# Patient Record
Sex: FEMALE | Race: WHITE | NOT HISPANIC OR LATINO | Employment: FULL TIME | URBAN - METROPOLITAN AREA
[De-identification: names, ages, dates, MRNs, and addresses within clinical notes are randomized per-mention and may not be internally consistent; named-entity substitution may affect disease eponyms.]

---

## 2023-04-04 DIAGNOSIS — E55.9 VITAMIN D DEFICIENCY: Primary | ICD-10-CM

## 2023-04-04 RX ORDER — ASPIRIN 325 MG
50000 TABLET, DELAYED RELEASE (ENTERIC COATED) ORAL
COMMUNITY
Start: 2017-02-08 | End: 2023-04-04 | Stop reason: SDUPTHER

## 2023-04-04 RX ORDER — ASPIRIN 325 MG
50000 TABLET, DELAYED RELEASE (ENTERIC COATED) ORAL
Qty: 90 CAPSULE | Refills: 1 | Status: SHIPPED | OUTPATIENT
Start: 2023-04-04

## 2023-06-29 ENCOUNTER — TELEPHONE (OUTPATIENT)
Dept: PRIMARY CARE | Facility: CLINIC | Age: 68
End: 2023-06-29

## 2023-06-29 NOTE — TELEPHONE ENCOUNTER
Patient requesting a phone call regarding a medication questions  and also requesting to have labs put in please call to advise.

## 2023-07-11 DIAGNOSIS — Z86.69 HX OF MIGRAINE HEADACHES: Primary | ICD-10-CM

## 2023-07-11 RX ORDER — RIMEGEPANT SULFATE 75 MG/75MG
75 TABLET, ORALLY DISINTEGRATING ORAL EVERY OTHER DAY
Qty: 16 TABLET | Refills: 2 | Status: SHIPPED | OUTPATIENT
Start: 2023-07-11 | End: 2023-08-17 | Stop reason: SDUPTHER

## 2023-07-11 RX ORDER — RIMEGEPANT SULFATE 75 MG/75MG
75 TABLET, ORALLY DISINTEGRATING ORAL EVERY OTHER DAY
COMMUNITY
End: 2023-07-11 | Stop reason: SDUPTHER

## 2023-08-17 DIAGNOSIS — Z86.69 HX OF MIGRAINE HEADACHES: ICD-10-CM

## 2023-08-17 RX ORDER — RIMEGEPANT SULFATE 75 MG/75MG
75 TABLET, ORALLY DISINTEGRATING ORAL EVERY OTHER DAY
Qty: 16 TABLET | Refills: 2 | Status: SHIPPED | OUTPATIENT
Start: 2023-08-17 | End: 2023-10-18 | Stop reason: DRUGHIGH

## 2023-09-23 DIAGNOSIS — U07.1 COVID-19: Primary | ICD-10-CM

## 2023-10-17 PROBLEM — Z90.89 HISTORY OF PARATHYROIDECTOMY: Status: ACTIVE | Noted: 2017-05-01

## 2023-10-17 PROBLEM — Z86.39 HISTORY OF HYPERPARATHYROIDISM: Status: ACTIVE | Noted: 2019-07-17

## 2023-10-17 PROBLEM — F41.9 ANXIETY: Status: ACTIVE | Noted: 2023-10-17

## 2023-10-17 PROBLEM — K21.9 GERD (GASTROESOPHAGEAL REFLUX DISEASE): Status: ACTIVE | Noted: 2023-10-17

## 2023-10-17 PROBLEM — J30.9 ALLERGIC RHINITIS: Status: ACTIVE | Noted: 2023-10-17

## 2023-10-17 PROBLEM — N95.2 ATROPHIC VAGINITIS: Status: ACTIVE | Noted: 2023-10-17

## 2023-10-17 PROBLEM — Z98.890 HISTORY OF PARATHYROIDECTOMY: Status: ACTIVE | Noted: 2017-05-01

## 2023-10-17 PROBLEM — U07.1 COVID-19: Status: ACTIVE | Noted: 2023-10-17

## 2023-10-17 PROBLEM — M18.11 ARTHRITIS OF CARPOMETACARPAL (CMC) JOINT OF RIGHT THUMB: Status: ACTIVE | Noted: 2023-10-17

## 2023-10-17 PROBLEM — R00.2 PALPITATIONS: Status: ACTIVE | Noted: 2023-10-17

## 2023-10-17 RX ORDER — MELOXICAM 15 MG/1
1 TABLET ORAL 2 TIMES DAILY
COMMUNITY
Start: 2013-10-23

## 2023-10-17 RX ORDER — ESTRADIOL 4 UG/1
INSERT VAGINAL
COMMUNITY
End: 2024-04-29 | Stop reason: WASHOUT

## 2023-10-17 RX ORDER — IBUPROFEN 200 MG
CAPSULE ORAL
COMMUNITY
Start: 2023-04-28 | End: 2024-04-29 | Stop reason: ALTCHOICE

## 2023-10-17 RX ORDER — ALPRAZOLAM 0.5 MG/1
1 TABLET ORAL 3 TIMES DAILY
COMMUNITY
Start: 2014-10-09 | End: 2023-10-18 | Stop reason: ALTCHOICE

## 2023-10-17 RX ORDER — LEVOTHYROXINE SODIUM 100 UG/1
1 TABLET ORAL DAILY
COMMUNITY
Start: 2006-10-30 | End: 2023-10-31 | Stop reason: SDUPTHER

## 2023-10-17 RX ORDER — BUTALBITAL, ACETAMINOPHEN AND CAFFEINE 300; 40; 50 MG/1; MG/1; MG/1
1 CAPSULE ORAL EVERY 6 HOURS PRN
COMMUNITY
Start: 2015-06-06 | End: 2024-04-29 | Stop reason: WASHOUT

## 2023-10-17 RX ORDER — ATENOLOL 50 MG/1
1 TABLET ORAL DAILY
COMMUNITY
Start: 2006-10-30 | End: 2023-10-31 | Stop reason: SDUPTHER

## 2023-10-17 RX ORDER — LANOLIN ALCOHOL/MO/W.PET/CERES
1 CREAM (GRAM) TOPICAL DAILY
COMMUNITY
Start: 2017-04-14

## 2023-10-17 NOTE — PROGRESS NOTES
Subjective   Reason for Visit: Cierra Atkins is an 67 y.o. female here for a Medicare Wellness visit.               HPI  The patient tested positive for COVID-19 on September 23, 2023.  She does report a history of an occasional nonproductive cough, nasal congestion, intermittent bilateral facial pain since testing positive.  She does report a decrease in the frequency of cough, decrease in the frequency and intensity of the episodes of bilateral facial pain and some improvement in nasal congestion over the past 3 weeks..  She reports no fever, shortness of breath, wheezing, chest pain, rhinorrhea, postnasal drip, sneezing.    She also reports a history of alternating consistencies of stool as well as constant abdominal distention since completing a course of Paxlovid at the end of last month.  She reports that the abdominal distention does transiently improve with defecation.  She does report that she did experience passage of multiple liquid stools per day while taking the Paxlovid..  She also reports that she was experiencing frequent episodes of generalized abdominal cramping pain for a period of a few weeks up until past few days.  She also reports a history of a constant sour taste in her mouth since completing the course of Paxlovid.  She reports no exacerbating or relieving factors..  She reports no nausea, vomiting, melena, hematochezia.  She has been using omeprazole 20 mg daily for approximately 1 week and IBgard 90 mg p.o. twice daily for approximately 1 week.    The patient does report a history of generalized fatigue since testing positive for COVID-19.  She does report that her energy level has improved over the past week but is not yet back at baseline.  She does report a history of anorexia which developed at the time that she tested positive for COVID-19 but she reports that her appetite has been back to normal over the past few weeks.    Over the past year, the patient reports continued chronic  intermittent episodes of pressure or sharp pain located in the right cheek, right retro-orbital region greater than left cheek and left retro-orbital region.  She again reports that the episodes can be precipitated by humidity changes, stress, lack of sleep.  She reports that the duration of each episode varies from 20 to 30 minutes to 3 to 5 days with her using coffee, Tylenol, and Nurtec as abortive agents.  She reports no associated visual changes, slurred speech, focal weakness/paresthesias, nausea, vomiting, phonophobia, photophobia.  She reports that she has been averaging 4-7 episodes per month over the past several months.    Over the past year, the patient reports continued chronic intermittent episodes of throbbing pain at the CMC joints of the thumbs.  She again reports that the episodes are precipitated by increased use of the hands.  She reports no associated symptoms.  She does report an increase in the frequency and intensity of the episodes recently as she has been using her hands more.  She has been using her hands more secondary to increased anxiety that she has had over conditions in the [place]..  She reports that intermittently she has had difficulty falling asleep because of the anxiety.  No other new complaints.  Patient Care Team:  Amol Palacio MD as PCP - General  Amol Palacio MD as PCP - [facility] PCP     Review of Systems  All systems have been reviewed and are normal except as previously noted  Objective   Vitals:  There were no vitals taken for this visit.      Physical Exam  Head - palpation revealed no tenderness over the maxillary or frontal sinuses.  Eyes - extraocular movements intact pupils equal and reactive to light; fundi revealed good retinal color no hemorrhages or exudates.  Ears - palpation of pinnas and traguses revealed no tenderness. External auditory canals not erythematous or swollen. The canals are narrow however. Right TM not visualized secondary to impacted  cerumen. Left TM not visualized secondary to impacted cerumen  Nose - turbinates not erythematous or swollen; nasal septal deviation noted to the right.  Mouth - posterior pharynx is not erythematous or swollen. Tonsillar pillars appeared normal no exudates.  Neck - no lymphadenopathy. Thyroid gland not enlarged. No bruits.  Lungs - clear to auscultation bilaterally.  Cardiac - , rate normal, rhythm regular, no murmurs, no JVD  Abdomen - soft nondistended,, hypoactive bowel sounds. There is a 2 x 2 centimeter mass in the umbilicus. Palpation revealed no tenderness or increase in warmth.; no tenderness or masses noted throughout the rest of the abdomen;   Pulses - 2+ bilaterally  Extremities - no peripheral edema.  Musculoskeletal:  Cervical spine-no erythema or swelling.  Full range of motion with no pain or tightness noted on extension.  Full range of motion with tightness noted in all other directions of motion..  Palpation revealed no tenderness or increase in warmth  CMC joints of both thumbs - No erythema or swelling.  Full range of motion in all directions of motion with no pain.. Palpation revealed no tenderness or increase in warmth.  Hands-   Heberden's nodes are noted on the second through the fourth fingers. Full range of motion in all joints in all directions of motion with no pain. Palpation revealed no tenderness or increase in warmth.  SKin - Diffuse xerosis noted. Spider veins noted along the medial surface right thigh. Few varicose veins noted in the lower legs bilaterally.     Neurologic  Mental status-alert and oriented x3   Cranial nerves-2 through 12 grossly intact, no visual field abnormalities  Motor-no pronator drift noted, strength-5/5 in all muscle groups tested, , no tremor noted.  No bradykinesia noted.  No rigidity noted.  Negative pull test  Sensory-Light touch sensation fully intact  Pinprick sensation fully intact  Vibratory sensation fully intact  Cerebellar-no truncal ataxia, good  coordination finger-nose testing,, good coordination heel-to-shin testing, normal rapid alternating movements  Romberg negative, no abnormality in tandem gait  Reflexes-1+/4 bilaterally  Assessment/Plan   Problem List Items Addressed This Visit    None       Assessment  Noncardiac chest pain  Benign palpitations  Occasional nonproductive cough, nasal congestion, intermittent episodes of facial pain-May be secondary to COVID-19  COVID-19 September 2022, September 23, 2023.  Constant sour taste in the mouth-may be secondary to gastroesophageal reflux, postacute COVID-19 symptom  Constant generalized abdominal distention-may be secondary to flare of irritable bowel syndrome secondary to COVID-19, use of Paxlovid.  Alternating consistencies of stool-May be secondary to flare of irritable bowel syndrome secondary to COVID-19, side effect from administration of Paxlovid  Chronic presence of a mass in the umbilical region-likely represents an umbilical hernia  Chronic constipation   Irritable bowel syndrome   Hemorrhoids  Cervical dysplasia  Mild tendinopathy right supraspinatus tendon  Hill-Sachs deformity right shoulder.  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  Mild primary hyperparathyroidism status post parathyroidectomy 2013; status post parathyroidectomy with exploration March 23, 2023  Chronic presence of area of hyperkeratosis noted over the medial surface of the right second toe-with associated pain at the site after periods of long walking-unsure of etiology.  Chronic intermittent episodes of pressure or sharp pain located in the right cheek as well as the right retro-orbital region-greater than the left cheek and left retro-orbital region probably represent migraine headaches...  Migraine  headaches  Chronic intermittent episodes of soreness over the entire posterior surface of the neck -may be secondary to osteoarthritis and degenerative disc disease of cervical spine.  Osteoarthritis and degenerative disc disease of the cervical spine.  Left-sided neuroforaminal stenosis cervical spine.  Osteoarthritis of the lumbosacral spine.  Anxiety     Plan  Obtain EKG and urinalysis today.. Obtain CBC differential, CMP, fasting lipid profile, TSH, , vitamin B12 level, hemoglobin A1c,hs CRP today  Administer 1 dose of flu vaccine today.  I have told the patient to begin saline spray 2 sprays to each nostril 2-3 times per day and to continue Flonase spray 1 spray to each nostril twice daily as needed  I told the patient to continue her current dosages of omeprazole and IBgard for now since she has only been using the medications for approximately 1 week.  I did tell the patient to call me in 3 weeks with her condition regarding her gastrointestinal symptoms.  I told the patient to restart feverfew daily.  I will prescribe hydroxyzine to be used at a dose of 25 to 50 mg p.o. nightly and I recommend that the patient avoid using alprazolam that had been prescribed by her endocrine surgeon.  The patient will return for an annual Medicare wellness visit in 1 year

## 2023-10-18 ENCOUNTER — OFFICE VISIT (OUTPATIENT)
Dept: PRIMARY CARE | Facility: CLINIC | Age: 68
End: 2023-10-18
Payer: COMMERCIAL

## 2023-10-18 ENCOUNTER — LAB (OUTPATIENT)
Dept: LAB | Facility: LAB | Age: 68
End: 2023-10-18
Payer: COMMERCIAL

## 2023-10-18 VITALS
WEIGHT: 146 LBS | BODY MASS INDEX: 25.86 KG/M2 | DIASTOLIC BLOOD PRESSURE: 68 MMHG | SYSTOLIC BLOOD PRESSURE: 98 MMHG | HEART RATE: 74 BPM

## 2023-10-18 DIAGNOSIS — G43.009 MIGRAINE WITHOUT AURA AND WITHOUT STATUS MIGRAINOSUS, NOT INTRACTABLE: ICD-10-CM

## 2023-10-18 DIAGNOSIS — J30.9 ALLERGIC RHINITIS, UNSPECIFIED SEASONALITY, UNSPECIFIED TRIGGER: ICD-10-CM

## 2023-10-18 DIAGNOSIS — Z00.00 ROUTINE GENERAL MEDICAL EXAMINATION AT A HEALTH CARE FACILITY: ICD-10-CM

## 2023-10-18 DIAGNOSIS — U07.1 COVID-19: ICD-10-CM

## 2023-10-18 DIAGNOSIS — Z86.69 HX OF MIGRAINE HEADACHES: ICD-10-CM

## 2023-10-18 DIAGNOSIS — R00.2 PALPITATIONS: ICD-10-CM

## 2023-10-18 DIAGNOSIS — R00.2 PALPITATIONS: Primary | ICD-10-CM

## 2023-10-18 DIAGNOSIS — K21.9 GASTROESOPHAGEAL REFLUX DISEASE WITHOUT ESOPHAGITIS: ICD-10-CM

## 2023-10-18 LAB
ALBUMIN SERPL BCP-MCNC: 4.5 G/DL (ref 3.4–5)
ALP SERPL-CCNC: 78 U/L (ref 33–136)
ALT SERPL W P-5'-P-CCNC: 17 U/L (ref 7–45)
ANION GAP SERPL CALC-SCNC: 11 MMOL/L (ref 10–20)
AST SERPL W P-5'-P-CCNC: 13 U/L (ref 9–39)
BILIRUB SERPL-MCNC: 0.7 MG/DL (ref 0–1.2)
BUN SERPL-MCNC: 15 MG/DL (ref 6–23)
CALCIUM SERPL-MCNC: 10 MG/DL (ref 8.6–10.6)
CHLORIDE SERPL-SCNC: 102 MMOL/L (ref 98–107)
CHOLEST SERPL-MCNC: 170 MG/DL (ref 0–199)
CHOLESTEROL/HDL RATIO: 3.3
CO2 SERPL-SCNC: 33 MMOL/L (ref 21–32)
CREAT SERPL-MCNC: 0.75 MG/DL (ref 0.5–1.05)
EST. AVERAGE GLUCOSE BLD GHB EST-MCNC: 108 MG/DL
GFR SERPL CREATININE-BSD FRML MDRD: 87 ML/MIN/1.73M*2
GLUCOSE SERPL-MCNC: 97 MG/DL (ref 74–99)
HBA1C MFR BLD: 5.4 %
HDLC SERPL-MCNC: 51.3 MG/DL
LDLC SERPL CALC-MCNC: 89 MG/DL
NON HDL CHOLESTEROL: 119 MG/DL (ref 0–149)
POC APPEARANCE, URINE: CLEAR
POC BILIRUBIN, URINE: NEGATIVE
POC BLOOD, URINE: ABNORMAL
POC COLOR, URINE: YELLOW
POC GLUCOSE, URINE: NEGATIVE MG/DL
POC KETONES, URINE: NEGATIVE MG/DL
POC LEUKOCYTES, URINE: ABNORMAL
POC NITRITE,URINE: NEGATIVE
POC PH, URINE: 6 PH
POC PROTEIN, URINE: NEGATIVE MG/DL
POC SPECIFIC GRAVITY, URINE: 1.01
POC UROBILINOGEN, URINE: 0.2 EU/DL
POTASSIUM SERPL-SCNC: 4.5 MMOL/L (ref 3.5–5.3)
PROT SERPL-MCNC: 7.3 G/DL (ref 6.4–8.2)
SODIUM SERPL-SCNC: 141 MMOL/L (ref 136–145)
TRIGL SERPL-MCNC: 150 MG/DL (ref 0–149)
TSH SERPL-ACNC: 1.64 MIU/L (ref 0.44–3.98)
VIT B12 SERPL-MCNC: 705 PG/ML (ref 211–911)
VLDL: 30 MG/DL (ref 0–40)

## 2023-10-18 PROCEDURE — 80053 COMPREHEN METABOLIC PANEL: CPT

## 2023-10-18 PROCEDURE — 36415 COLL VENOUS BLD VENIPUNCTURE: CPT

## 2023-10-18 PROCEDURE — 1125F AMNT PAIN NOTED PAIN PRSNT: CPT | Performed by: INTERNAL MEDICINE

## 2023-10-18 PROCEDURE — 82607 VITAMIN B-12: CPT

## 2023-10-18 PROCEDURE — 81002 URINALYSIS NONAUTO W/O SCOPE: CPT | Performed by: INTERNAL MEDICINE

## 2023-10-18 PROCEDURE — 83036 HEMOGLOBIN GLYCOSYLATED A1C: CPT

## 2023-10-18 PROCEDURE — 1160F RVW MEDS BY RX/DR IN RCRD: CPT | Performed by: INTERNAL MEDICINE

## 2023-10-18 PROCEDURE — 93000 ELECTROCARDIOGRAM COMPLETE: CPT | Performed by: INTERNAL MEDICINE

## 2023-10-18 PROCEDURE — 84443 ASSAY THYROID STIM HORMONE: CPT

## 2023-10-18 PROCEDURE — G0439 PPPS, SUBSEQ VISIT: HCPCS | Performed by: INTERNAL MEDICINE

## 2023-10-18 PROCEDURE — 1159F MED LIST DOCD IN RCRD: CPT | Performed by: INTERNAL MEDICINE

## 2023-10-18 PROCEDURE — 1170F FXNL STATUS ASSESSED: CPT | Performed by: INTERNAL MEDICINE

## 2023-10-18 PROCEDURE — 80061 LIPID PANEL: CPT

## 2023-10-18 RX ORDER — PEPPERMINT OIL 90 MG
CAPSULE, DELAYED, AND EXTENDED RELEASE ORAL
COMMUNITY

## 2023-10-18 RX ORDER — OMEPRAZOLE 20 MG/1
20 CAPSULE, DELAYED RELEASE ORAL
COMMUNITY

## 2023-10-18 RX ORDER — RIMEGEPANT SULFATE 75 MG/75MG
75 TABLET, ORALLY DISINTEGRATING ORAL DAILY
Qty: 16 TABLET | Refills: 2 | Status: SHIPPED | OUTPATIENT
Start: 2023-10-18 | End: 2024-04-30 | Stop reason: SDUPTHER

## 2023-10-18 ASSESSMENT — ENCOUNTER SYMPTOMS
DEPRESSION: 0
OCCASIONAL FEELINGS OF UNSTEADINESS: 0
LOSS OF SENSATION IN FEET: 0

## 2023-10-18 ASSESSMENT — ACTIVITIES OF DAILY LIVING (ADL)
DRESSING: INDEPENDENT
DOING_HOUSEWORK: INDEPENDENT
MANAGING_FINANCES: INDEPENDENT
BATHING: INDEPENDENT
TAKING_MEDICATION: INDEPENDENT
GROCERY_SHOPPING: INDEPENDENT

## 2023-10-31 ENCOUNTER — LAB (OUTPATIENT)
Dept: LAB | Facility: LAB | Age: 68
End: 2023-10-31
Payer: COMMERCIAL

## 2023-10-31 ENCOUNTER — OFFICE VISIT (OUTPATIENT)
Dept: PRIMARY CARE | Facility: CLINIC | Age: 68
End: 2023-10-31
Payer: COMMERCIAL

## 2023-10-31 ENCOUNTER — ANCILLARY PROCEDURE (OUTPATIENT)
Dept: RADIOLOGY | Facility: CLINIC | Age: 68
End: 2023-10-31
Payer: COMMERCIAL

## 2023-10-31 VITALS
SYSTOLIC BLOOD PRESSURE: 140 MMHG | DIASTOLIC BLOOD PRESSURE: 84 MMHG | WEIGHT: 149 LBS | BODY MASS INDEX: 26.39 KG/M2 | HEART RATE: 100 BPM

## 2023-10-31 DIAGNOSIS — F41.9 ANXIETY: ICD-10-CM

## 2023-10-31 DIAGNOSIS — R09.89 CHEST CONGESTION: Primary | ICD-10-CM

## 2023-10-31 DIAGNOSIS — R09.89 CHEST CONGESTION: ICD-10-CM

## 2023-10-31 DIAGNOSIS — R00.2 PALPITATIONS: ICD-10-CM

## 2023-10-31 DIAGNOSIS — Z86.69 HX OF MIGRAINE HEADACHES: Primary | ICD-10-CM

## 2023-10-31 DIAGNOSIS — Z86.69 HX OF MIGRAINE HEADACHES: ICD-10-CM

## 2023-10-31 LAB — D DIMER PPP FEU-MCNC: 595 NG/ML FEU

## 2023-10-31 PROCEDURE — 71046 X-RAY EXAM CHEST 2 VIEWS: CPT | Mod: FOREIGN READ | Performed by: RADIOLOGY

## 2023-10-31 PROCEDURE — 71046 X-RAY EXAM CHEST 2 VIEWS: CPT | Mod: FY,FR

## 2023-10-31 PROCEDURE — 1125F AMNT PAIN NOTED PAIN PRSNT: CPT | Performed by: INTERNAL MEDICINE

## 2023-10-31 PROCEDURE — 99213 OFFICE O/P EST LOW 20 MIN: CPT | Performed by: INTERNAL MEDICINE

## 2023-10-31 PROCEDURE — 1159F MED LIST DOCD IN RCRD: CPT | Performed by: INTERNAL MEDICINE

## 2023-10-31 PROCEDURE — 85379 FIBRIN DEGRADATION QUANT: CPT

## 2023-10-31 PROCEDURE — 1160F RVW MEDS BY RX/DR IN RCRD: CPT | Performed by: INTERNAL MEDICINE

## 2023-10-31 PROCEDURE — 36415 COLL VENOUS BLD VENIPUNCTURE: CPT

## 2023-10-31 RX ORDER — ATENOLOL 50 MG/1
50 TABLET ORAL DAILY
Qty: 90 TABLET | Refills: 2 | Status: SHIPPED | OUTPATIENT
Start: 2023-10-31 | End: 2023-12-19

## 2023-10-31 RX ORDER — HYDROXYZINE PAMOATE 25 MG/1
25 CAPSULE ORAL EVERY 6 HOURS PRN
Qty: 30 CAPSULE | Refills: 1 | Status: SHIPPED | OUTPATIENT
Start: 2023-10-31

## 2023-10-31 RX ORDER — LEVOTHYROXINE SODIUM 100 UG/1
100 TABLET ORAL DAILY
Qty: 90 TABLET | Refills: 3 | Status: SHIPPED | OUTPATIENT
Start: 2023-10-31 | End: 2023-12-19

## 2023-10-31 RX ORDER — DOXYCYCLINE 100 MG/1
100 CAPSULE ORAL 2 TIMES DAILY
Qty: 20 CAPSULE | Refills: 0 | Status: SHIPPED | OUTPATIENT
Start: 2023-10-31 | End: 2023-11-10

## 2023-10-31 RX ORDER — METHYLPREDNISOLONE 4 MG/1
TABLET ORAL
Qty: 21 TABLET | Refills: 0 | Status: SHIPPED | OUTPATIENT
Start: 2023-10-31 | End: 2023-11-07

## 2023-10-31 RX ORDER — HYDROXYZINE PAMOATE 25 MG/1
25 CAPSULE ORAL EVERY 6 HOURS PRN
COMMUNITY
End: 2023-10-31 | Stop reason: SDUPTHER

## 2023-10-31 NOTE — PROGRESS NOTES
Subjective   Patient ID: Cierra Atkins is a 68 y.o. female who presents not feeling well.    HPI   The patient reports that she woke up this morning at 340 experiencing severe left-sided chest tightness.  She sat up in bed and had a paroxysm of cough productive of scant yellow sticky sputum.  She reports that the tightness resolved after the paroxysm of cough.  Since her Medicare wellness visit, she reports a continued cough which she feels has increased in frequency.  She reports that since the weekend the cough has been productive of scant yellow sputum.  She also reports continued nasal congestion since her annual Medicare wellness visit.  She reports increased nasal congestion and yellow nasal discharge beginning this weekend.  She also reports a history of postnasal drip since the weekend.  She does report a history of a severe sore throat October 26 and October 27, 2023.    She does report a history of dyspnea when walking from the parking lot to my office today and when caring a laundry basket up the stairs today.        The patient reports a history of a constant pressure sharp pain over both cheeks and in both retro-orbital regions x3 days.  She reports associated photophobia, phonophobia and nausea.  She reports no visual changes, slurred speech, focal weakness/paresthesias.    The patient reports no fever, chills, shortness of breath at rest, wheezing, abdominal pain, vomiting, diarrhea, melena, hematochezia, decreased appetite, orthopnea, PND, lower extremity edema, presyncope.    Pulse ox on room air today was 96%  Review of Systems    Objective   There were no vitals taken for this visit.    Physical Exam  Head-palpation revealed tenderness over the maxillary sinuses bilaterally equally.  Palpation revealed no tenderness over the frontal sinuses bilaterally  Nose-turbinates not erythematous or swollen, no septal deviation noted..  Mouth-posterior pharynx is mildly erythematous but not swollen.    Tonsillar pillars appeared normal, no exudates  Neck-no lymphadenopathy.  Lungs-clear.  Cardiac-rate normal, rhythm regular, no murmurs, no JVD.  Abdomen-soft, nondistended. Normal active bowel sounds. Palpation revealed no tenderness or masses  Extremities-no peripheral edema  Neurologic  Mental status-alert and oriented x3   Cranial nerves-2 through 12 grossly intact, no visual field abnormalities  Motor-no pronator drift noted, strength-5/5 in all muscle groups tested, , no tremor noted.  No bradykinesia noted.  No rigidity noted.  Negative pull test  Sensory-Light touch sensation fully intact  Pinprick sensation fully intact  Vibratory sensation fully intact  Cerebellar-no truncal ataxia, good coordination finger-nose testing,, good coordination heel-to-shin testing, normal rapid alternating movements  Romberg negative, no abnormality in tandem gait  Reflexes-2+/4 bilaterally  Assessment/Plan        Assessment  History of episode of intense chest tightness occurring this morning followed by paroxysm of cough productive of scant yellow sticky sputum-possible etiologies include viral syndrome, bronchitis in the setting of postacute COVID-19 cough  Dyspnea on exertion today-May be secondary to bronchospasm secondary to viral syndrome, bronchitis.  Pneumonia I suppose is a possible etiology but this is less likely  Continued cough increased in frequency, increased nasal congestion, purulent nasal discharge, postnasal drip-May be secondary to viral syndrome, sinusitis.  Constant pressure, sharp pain in both cheeks and in both retro-orbital regions-may be secondary to status migrainosus.  Plan  Obtain EKG, chest x-ray stat  Obtain D-dimer test today.  If the x-ray is unremarkable, I will empirically start the patient on doxycycline 100 mg p.o. twice daily x10 days.  If the x-ray is unremarkable, I also will start a Medrol Dosepak.

## 2023-11-17 ENCOUNTER — TELEPHONE (OUTPATIENT)
Dept: PRIMARY CARE | Facility: CLINIC | Age: 68
End: 2023-11-17

## 2023-11-17 NOTE — TELEPHONE ENCOUNTER
Patient called  still not feeling any better. Finished all meds.  Wants you to give her call [phone]

## 2023-11-20 ENCOUNTER — OFFICE VISIT (OUTPATIENT)
Dept: PRIMARY CARE | Facility: CLINIC | Age: 68
End: 2023-11-20
Payer: COMMERCIAL

## 2023-11-20 VITALS
WEIGHT: 146.8 LBS | HEART RATE: 80 BPM | SYSTOLIC BLOOD PRESSURE: 108 MMHG | BODY MASS INDEX: 26 KG/M2 | DIASTOLIC BLOOD PRESSURE: 70 MMHG

## 2023-11-20 DIAGNOSIS — J32.0 CHRONIC MAXILLARY SINUSITIS: Primary | ICD-10-CM

## 2023-11-20 DIAGNOSIS — G43.009 MIGRAINE WITHOUT AURA AND WITHOUT STATUS MIGRAINOSUS, NOT INTRACTABLE: ICD-10-CM

## 2023-11-20 DIAGNOSIS — U07.1 COVID-19: ICD-10-CM

## 2023-11-20 PROCEDURE — 1159F MED LIST DOCD IN RCRD: CPT | Performed by: INTERNAL MEDICINE

## 2023-11-20 PROCEDURE — 1160F RVW MEDS BY RX/DR IN RCRD: CPT | Performed by: INTERNAL MEDICINE

## 2023-11-20 PROCEDURE — 99213 OFFICE O/P EST LOW 20 MIN: CPT | Performed by: INTERNAL MEDICINE

## 2023-11-20 PROCEDURE — 1125F AMNT PAIN NOTED PAIN PRSNT: CPT | Performed by: INTERNAL MEDICINE

## 2023-11-20 RX ORDER — MOXIFLOXACIN HYDROCHLORIDE 400 MG/1
400 TABLET ORAL DAILY
COMMUNITY
End: 2023-11-20 | Stop reason: SDUPTHER

## 2023-11-20 RX ORDER — MOXIFLOXACIN HYDROCHLORIDE 400 MG/1
400 TABLET ORAL DAILY
Qty: 10 TABLET | Refills: 0 | Status: SHIPPED | OUTPATIENT
Start: 2023-11-20 | End: 2024-01-25 | Stop reason: ALTCHOICE

## 2023-11-20 NOTE — PROGRESS NOTES
Subjective   Patient ID: Cierra Atkins is a 68 y.o. female who presents for sinus infection.    HPI   The patient reports that she had been experiencing continued cough, postnasal drip, nasal congestion-all dramatically improved by the time the patient completed a 10-day course of doxycycline 10 days ago.  The patient reports a history of frequent cough productive of green sputum, increased nasal congestion, green nasal discharge, copious postnasal drip, rhinorrhea, loss of sense of smell and taste, decreased appetite beginningNovember 16, 2023.  She does report a history of dyspnea walking today from the parking lot to my office.  She reports no other associated symptoms.  Pulse ox on room air today-98%  Review of Systems    Objective   There were no vitals taken for this visit.    Physical Exam  Head-palpation revealed tenderness over the maxillary sinuses bilaterally equally.  Palpation revealed   Nose-turbinates not erythematous or swollen, no septal deviation noted..  Mouth-posterior pharynx is not erythematous  not swollen.   Tonsillar pillars appeared normal, no exudates  Neck-no lymphadenopathy.  Lungs-clear.  Cardiac-rate normal, rhythm regular, no murmurs, no JVD.  Abdomen-soft, nondistended. Normal active bowel sounds. Palpation revealed no tenderness or masses  Extremities-no peripheral edema  Assessment/Plan         Assessment  Dyspnea on exertion today-may have been secondary to lack of conditioning, bronchospasm secondary to viral syndrome, sinusitis, bronchitis.  Cough, nasal congestion, purulent nasal discharge copious postnasal drip, loss of sense of smell and taste-May be secondary to viral syndrome, recurrent or refractory sinusitis  Anorexia-May be secondary to viral syndrome, recurrent or refractory sinusitis.  Plan  Begin moxifloxacin 400 mg p.o. daily x10 days.  Begin Astepro 1 spray to each nostril twice daily and continue Flonase 1 spray to each nostril twice daily.  Begin saline spray 2  sprays each nostril 3-4 times per day as needed.  Begin honey 15 mL p.o. every 4 to 6 hours as needed cough.  I told the patient to call if symptoms have not improved in 5 days; not resolved in 10 days

## 2023-12-19 DIAGNOSIS — Z86.69 HX OF MIGRAINE HEADACHES: ICD-10-CM

## 2023-12-19 DIAGNOSIS — R00.2 PALPITATIONS: ICD-10-CM

## 2023-12-19 DIAGNOSIS — F41.9 ANXIETY: ICD-10-CM

## 2023-12-19 RX ORDER — ATENOLOL 50 MG/1
50 TABLET ORAL DAILY
Qty: 90 TABLET | Refills: 3 | Status: SHIPPED | OUTPATIENT
Start: 2023-12-19

## 2023-12-19 RX ORDER — LEVOTHYROXINE SODIUM 100 UG/1
100 TABLET ORAL DAILY
Qty: 90 TABLET | Refills: 3 | Status: SHIPPED | OUTPATIENT
Start: 2023-12-19

## 2024-01-25 DIAGNOSIS — L30.9 DERMATITIS: Primary | ICD-10-CM

## 2024-01-25 RX ORDER — HYDROCORTISONE 25 MG/G
CREAM TOPICAL 2 TIMES DAILY
COMMUNITY
End: 2024-01-25 | Stop reason: SDUPTHER

## 2024-01-25 RX ORDER — HYDROCORTISONE 25 MG/G
CREAM TOPICAL 2 TIMES DAILY
Qty: 28 G | Refills: 1 | Status: SHIPPED | OUTPATIENT
Start: 2024-01-25

## 2024-02-22 DIAGNOSIS — J32.0 CHRONIC MAXILLARY SINUSITIS: Primary | ICD-10-CM

## 2024-02-22 RX ORDER — DOXYCYCLINE 100 MG/1
100 CAPSULE ORAL 2 TIMES DAILY
Qty: 20 CAPSULE | Refills: 0 | Status: SHIPPED | OUTPATIENT
Start: 2024-02-22 | End: 2024-03-03

## 2024-02-22 NOTE — PROGRESS NOTES
Patient emailed me yesterday to report a 2-week history of cough, nasal congestion, purulent sputum, purulent nasal discharge, cheek and head pain.  She was seen at an urgent care center in [place] and was prescribed prednisone 50 mg daily along with Tessalon Perles and Zyrtec.  I told the patient to continue her use Flonase spray and Zyrtec.  I also have started her on doxycycline 100 mg p.o. twice daily x 10 days.  I told her to hold off on taking any prednisone at this time.  She will call me in 10 days with her condition

## 2024-03-22 ENCOUNTER — TELEPHONE (OUTPATIENT)
Dept: PRIMARY CARE | Facility: CLINIC | Age: 69
End: 2024-03-22

## 2024-04-25 ENCOUNTER — APPOINTMENT (OUTPATIENT)
Dept: RADIOLOGY | Facility: CLINIC | Age: 69
End: 2024-04-25
Payer: COMMERCIAL

## 2024-04-29 ENCOUNTER — OFFICE VISIT (OUTPATIENT)
Dept: OBSTETRICS AND GYNECOLOGY | Facility: CLINIC | Age: 69
End: 2024-04-29
Payer: COMMERCIAL

## 2024-04-29 ENCOUNTER — HOSPITAL ENCOUNTER (OUTPATIENT)
Dept: RADIOLOGY | Facility: CLINIC | Age: 69
Discharge: HOME | End: 2024-04-29
Payer: COMMERCIAL

## 2024-04-29 VITALS
DIASTOLIC BLOOD PRESSURE: 80 MMHG | BODY MASS INDEX: 26.4 KG/M2 | HEIGHT: 63 IN | WEIGHT: 149 LBS | SYSTOLIC BLOOD PRESSURE: 133 MMHG

## 2024-04-29 VITALS — HEIGHT: 63 IN | WEIGHT: 148 LBS | BODY MASS INDEX: 26.22 KG/M2

## 2024-04-29 DIAGNOSIS — N95.2 VAGINAL ATROPHY: ICD-10-CM

## 2024-04-29 DIAGNOSIS — R87.612 LOW GRADE SQUAMOUS INTRAEPITHELIAL LESION (LGSIL) ON CERVICAL PAP SMEAR: ICD-10-CM

## 2024-04-29 DIAGNOSIS — Z01.419 NORMAL GYNECOLOGIC EXAMINATION: Primary | ICD-10-CM

## 2024-04-29 DIAGNOSIS — Z12.31 ENCOUNTER FOR SCREENING MAMMOGRAM FOR MALIGNANT NEOPLASM OF BREAST: ICD-10-CM

## 2024-04-29 DIAGNOSIS — Z78.0 MENOPAUSE: ICD-10-CM

## 2024-04-29 PROCEDURE — 77067 SCR MAMMO BI INCL CAD: CPT | Mod: BILATERAL PROCEDURE | Performed by: STUDENT IN AN ORGANIZED HEALTH CARE EDUCATION/TRAINING PROGRAM

## 2024-04-29 PROCEDURE — 1160F RVW MEDS BY RX/DR IN RCRD: CPT | Performed by: OBSTETRICS & GYNECOLOGY

## 2024-04-29 PROCEDURE — 77067 SCR MAMMO BI INCL CAD: CPT

## 2024-04-29 PROCEDURE — 1159F MED LIST DOCD IN RCRD: CPT | Performed by: OBSTETRICS & GYNECOLOGY

## 2024-04-29 PROCEDURE — 77063 BREAST TOMOSYNTHESIS BI: CPT | Mod: BILATERAL PROCEDURE | Performed by: STUDENT IN AN ORGANIZED HEALTH CARE EDUCATION/TRAINING PROGRAM

## 2024-04-29 PROCEDURE — 88175 CYTOPATH C/V AUTO FLUID REDO: CPT

## 2024-04-29 PROCEDURE — 87624 HPV HI-RISK TYP POOLED RSLT: CPT

## 2024-04-29 PROCEDURE — 1036F TOBACCO NON-USER: CPT | Performed by: OBSTETRICS & GYNECOLOGY

## 2024-04-29 PROCEDURE — 99397 PER PM REEVAL EST PAT 65+ YR: CPT | Performed by: OBSTETRICS & GYNECOLOGY

## 2024-04-29 ASSESSMENT — ENCOUNTER SYMPTOMS
CONSTITUTIONAL NEGATIVE: 1
ENDOCRINE NEGATIVE: 1
ALLERGIC/IMMUNOLOGIC NEGATIVE: 1
CARDIOVASCULAR NEGATIVE: 1
MUSCULOSKELETAL NEGATIVE: 1
RESPIRATORY NEGATIVE: 1
GASTROINTESTINAL NEGATIVE: 1
HEMATOLOGIC/LYMPHATIC NEGATIVE: 1
PSYCHIATRIC NEGATIVE: 1
NEUROLOGICAL NEGATIVE: 1
EYES NEGATIVE: 1

## 2024-04-29 NOTE — PROGRESS NOTES
Subjective   Patient ID: Cierra Atkins is a 68 y.o. female who presents for Annual Exam.  HPI patient is here for annual visit she has no    Review of Systems   Constitutional: Negative.    Eyes: Negative.    Respiratory: Negative.     Cardiovascular: Negative.    Gastrointestinal: Negative.    Endocrine: Negative.    Genitourinary: Negative.    Musculoskeletal: Negative.    Skin: Negative.    Allergic/Immunologic: Negative.    Neurological: Negative.    Hematological: Negative.    Psychiatric/Behavioral: Negative.         Objective   Physical Exam  Constitutional:       Appearance: Normal appearance.   HENT:      Head: Normocephalic and atraumatic.   Cardiovascular:      Rate and Rhythm: Normal rate and regular rhythm.      Pulses: Normal pulses.      Heart sounds: Normal heart sounds.   Pulmonary:      Effort: Pulmonary effort is normal.      Breath sounds: Normal breath sounds.   Abdominal:      General: Abdomen is flat. Bowel sounds are normal.      Palpations: Abdomen is soft.      Hernia: There is no hernia in the left inguinal area or right inguinal area.   Genitourinary:     General: Normal vulva.      Exam position: Lithotomy position.      Labia:         Right: No rash, tenderness or lesion.         Left: No rash, tenderness or lesion.       Urethra: No prolapse.      Vagina: Normal.      Cervix: Normal.      Uterus: Normal.       Adnexa: Right adnexa normal and left adnexa normal.   Musculoskeletal:      Cervical back: Normal range of motion and neck supple.   Skin:     General: Skin is warm and dry.   Neurological:      General: No focal deficit present.      Mental Status: She is alert and oriented to person, place, and time.         Assessment/Plan    normal gynecologic examination  Low-grade squamous intraepithelial  Vaginal atrophy  Pap test HPV typing  Refill on estradiol vaginal cream         Shaq Álvarez MD 04/29/24 9:38 AM

## 2024-04-30 ENCOUNTER — OFFICE VISIT (OUTPATIENT)
Dept: PRIMARY CARE | Facility: CLINIC | Age: 69
End: 2024-04-30
Payer: COMMERCIAL

## 2024-04-30 VITALS
WEIGHT: 149.2 LBS | DIASTOLIC BLOOD PRESSURE: 84 MMHG | BODY MASS INDEX: 26.43 KG/M2 | SYSTOLIC BLOOD PRESSURE: 130 MMHG | HEART RATE: 100 BPM

## 2024-04-30 DIAGNOSIS — Z86.39 HISTORY OF HYPERPARATHYROIDISM: ICD-10-CM

## 2024-04-30 DIAGNOSIS — G43.009 MIGRAINE WITHOUT AURA AND WITHOUT STATUS MIGRAINOSUS, NOT INTRACTABLE: Primary | ICD-10-CM

## 2024-04-30 DIAGNOSIS — F41.9 ANXIETY: ICD-10-CM

## 2024-04-30 DIAGNOSIS — Z86.69 HX OF MIGRAINE HEADACHES: ICD-10-CM

## 2024-04-30 DIAGNOSIS — H92.01 RIGHT EAR PAIN: ICD-10-CM

## 2024-04-30 PROCEDURE — 1160F RVW MEDS BY RX/DR IN RCRD: CPT | Performed by: INTERNAL MEDICINE

## 2024-04-30 PROCEDURE — 1159F MED LIST DOCD IN RCRD: CPT | Performed by: INTERNAL MEDICINE

## 2024-04-30 PROCEDURE — 99214 OFFICE O/P EST MOD 30 MIN: CPT | Performed by: INTERNAL MEDICINE

## 2024-04-30 RX ORDER — RIMEGEPANT SULFATE 75 MG/75MG
75 TABLET, ORALLY DISINTEGRATING ORAL DAILY
Qty: 10 TABLET | Refills: 2 | Status: SHIPPED | OUTPATIENT
Start: 2024-04-30 | End: 2024-06-04 | Stop reason: SDUPTHER

## 2024-04-30 NOTE — PROGRESS NOTES
Subjective   Patient ID: Cierra Atkins is a 68 y.o. female who presents for follow-up visit.    HPI   The patient reports a 2-week history of intermittent momentary pangs of pain in the right ear.  She reports no precipitating, exacerbating, relieving factors.  She reports no drainage from the ear.  She reports no other associated symptoms.  She reports that the episodes developed after she experienced severe bilateral ear pain when descending on a plane from a flight coming from [place] to [place].    The patient also reports continued chronic intermittent episodes of aching pain in the right cheek region and right retro-orbital region times years.  She reports no associated symptoms.  She does report that the episodes are a few hours in duration if she takes 1 dose of Nurtec.  On average, she has been experiencing 4-7 episodes per month.  This represents a significant decrease in the frequency and intensity of headaches since she has been taking Nurtec over the past year or so.  Review of Systems    Objective   There were no vitals taken for this visit.    Physical Exam  Head-right TMJ-no erythema or swelling.  Palpation revealed click but no tenderness  Ears  Right ear-palpation of the pinna and tragus revealed no tenderness.  External auditory canal not erythematous or swollen, TM clear  Assessment/Plan        Assessment  Intermittent momentary pains of pain in the right ear-May be secondary to eustachian tube dysfunction,, otitis externa right ear-unlikely.  I suppose right TMJ syndrome is a possible etiology  Plan  I told the patient to use her nasal corticosteroid spray 1 spray to the right nostril twice daily as needed.  I also told the patient that she could take either Claritin 10 mg daily or Allegra 180 mg daily as needed as well.  I told the patient to call me in 2 weeks with her condition.  If she notices no change in the frequency or intensity of the episodes, I will refer her to ENT

## 2024-05-09 LAB
CYTOLOGY CMNT CVX/VAG CYTO-IMP: NORMAL
HPV HR 12 DNA GENITAL QL NAA+PROBE: NEGATIVE
HPV HR GENOTYPES PNL CVX NAA+PROBE: NEGATIVE
HPV16 DNA SPEC QL NAA+PROBE: NEGATIVE
HPV18 DNA SPEC QL NAA+PROBE: NEGATIVE
LAB AP HPV GENOTYPE QUESTION: YES
LAB AP HPV HR: NORMAL
LAB AP PREVIOUS ABNORMAL HISTORY: NORMAL
LABORATORY COMMENT REPORT: NORMAL
LMP START DATE: NORMAL
MENSTRUAL HX REPORTED: NORMAL
PATH REPORT.RELEVANT HX SPEC: NORMAL
PATH REPORT.TOTAL CANCER: NORMAL

## 2024-05-10 ENCOUNTER — TELEPHONE (OUTPATIENT)
Dept: PRIMARY CARE | Facility: CLINIC | Age: 69
End: 2024-05-10
Payer: COMMERCIAL

## 2024-05-10 NOTE — TELEPHONE ENCOUNTER
Patient would like a call back about her D Dimer back in October that was abnormal. She would like a call back today to discuss

## 2024-06-04 DIAGNOSIS — Z86.69 HX OF MIGRAINE HEADACHES: ICD-10-CM

## 2024-06-04 RX ORDER — RIMEGEPANT SULFATE 75 MG/75MG
75 TABLET, ORALLY DISINTEGRATING ORAL DAILY
Qty: 16 TABLET | Refills: 2 | Status: SHIPPED | OUTPATIENT
Start: 2024-06-04

## 2024-06-12 DIAGNOSIS — Z86.69 HX OF MIGRAINE HEADACHES: ICD-10-CM

## 2024-06-12 RX ORDER — RIMEGEPANT SULFATE 75 MG/75MG
75 TABLET, ORALLY DISINTEGRATING ORAL DAILY
Qty: 16 TABLET | Refills: 3 | Status: SHIPPED | OUTPATIENT
Start: 2024-06-12

## 2024-09-11 ENCOUNTER — OFFICE VISIT (OUTPATIENT)
Dept: PRIMARY CARE | Facility: CLINIC | Age: 69
End: 2024-09-11
Payer: COMMERCIAL

## 2024-09-11 ENCOUNTER — HOSPITAL ENCOUNTER (OUTPATIENT)
Dept: RADIOLOGY | Facility: CLINIC | Age: 69
Discharge: HOME | End: 2024-09-11
Payer: COMMERCIAL

## 2024-09-11 VITALS
SYSTOLIC BLOOD PRESSURE: 138 MMHG | BODY MASS INDEX: 26.96 KG/M2 | WEIGHT: 152.2 LBS | TEMPERATURE: 96.9 F | DIASTOLIC BLOOD PRESSURE: 80 MMHG | HEART RATE: 80 BPM

## 2024-09-11 DIAGNOSIS — M47.812 CERVICAL ARTHRITIS: ICD-10-CM

## 2024-09-11 DIAGNOSIS — M54.2 NECK PAIN: ICD-10-CM

## 2024-09-11 DIAGNOSIS — G43.009 MIGRAINE WITHOUT AURA AND WITHOUT STATUS MIGRAINOSUS, NOT INTRACTABLE: ICD-10-CM

## 2024-09-11 DIAGNOSIS — M54.2 NECK PAIN: Primary | ICD-10-CM

## 2024-09-11 PROCEDURE — 72050 X-RAY EXAM NECK SPINE 4/5VWS: CPT | Performed by: RADIOLOGY

## 2024-09-11 PROCEDURE — 1159F MED LIST DOCD IN RCRD: CPT | Performed by: INTERNAL MEDICINE

## 2024-09-11 PROCEDURE — 99213 OFFICE O/P EST LOW 20 MIN: CPT | Performed by: INTERNAL MEDICINE

## 2024-09-11 PROCEDURE — 1160F RVW MEDS BY RX/DR IN RCRD: CPT | Performed by: INTERNAL MEDICINE

## 2024-09-11 PROCEDURE — 72050 X-RAY EXAM NECK SPINE 4/5VWS: CPT

## 2024-09-11 RX ORDER — MELOXICAM 15 MG/1
7.5 TABLET ORAL 2 TIMES DAILY
Qty: 30 TABLET | Refills: 5 | Status: SHIPPED | OUTPATIENT
Start: 2024-09-11

## 2024-09-11 RX ORDER — CHOLECALCIFEROL (VITAMIN D3) 50 MCG
50 TABLET ORAL DAILY
COMMUNITY

## 2024-09-11 RX ORDER — CYCLOBENZAPRINE HCL 10 MG
5 TABLET ORAL 3 TIMES DAILY PRN
Qty: 15 TABLET | Refills: 2 | Status: SHIPPED | OUTPATIENT
Start: 2024-09-11 | End: 2025-05-09

## 2024-09-11 NOTE — PROGRESS NOTES
Subjective   Patient ID: Cierra Atkins is a 68 y.o. female who presents for neck pain    HPI   Since the patient slipped on water in [place] and fell, she has experienced constant soreness over the posterior surface of the neck.  She reports experiencing intermittent momentary episodes of zinging pain in the left upper trapezius region as well.  She does report an increase in the intensity of the soreness when she lies on either side or with increased activity.  She reports that yesterday morning she experienced a brief episode of tingling in the left hand lasting a few minutes.  She experienced a similar brief episode of tingling in the fourth and fifth fingers of the right hand a few evenings ago.  She again reports that the episode was a few minutes in duration.  She reports no upper extremity weakness.  For the first time yesterday, the patient did take a 7.5 mg dose of meloxicam yesterday and experienced some decrease in the intensity of the soreness..  She also has been applying Voltaren gel to the neck as needed.  Review of Systems    Objective   There were no vitals taken for this visit.    Physical Exam  Musculoskeletal  Cervical spine-no erythema or swelling.  Full range of motion with increased intensity of soreness on rotation and bending bilaterally.  Full range of motion with no increased intensity of soreness in all other directions of motion.  Palpation did reveal tenderness at the superior and midportion left trapezius region, no increase in warmth    Neurologic  Upper extremities:  Motor-strength 5/5 in all muscle groups tested  Sensory  Light touch and pinprick sensation fully intact  Reflexes-2+/4 bilaterally  Assessment/Plan   Problem List Items Addressed This Visit             ICD-10-CM    Migraine without aura and without status migrainosus, not intractable G43.009    Relevant Medications    meloxicam (Mobic) 15 mg tablet    cyclobenzaprine (Flexeril) 10 mg tablet    Other Relevant Orders     XR cervical spine 2-3 views     Other Visit Diagnoses         Codes    Neck pain    -  Primary M54.2    Relevant Medications    meloxicam (Mobic) 15 mg tablet    cyclobenzaprine (Flexeril) 10 mg tablet    Other Relevant Orders    XR cervical spine 2-3 views    Cervical arthritis     M47.812    Relevant Medications    meloxicam (Mobic) 15 mg tablet    cyclobenzaprine (Flexeril) 10 mg tablet    Other Relevant Orders    XR cervical spine 2-3 views             Assessment  Constant soreness noted over the posterior surface of the neck, intermittent zinging discomfort left upper trapezius region-May be secondary to cervical sprain in the setting of the patient's osteoarthritis and degenerative disc disease of the cervical spine  Bilateral cervical radiculopathies secondary to central canal stenosis cervical spine, bilateral neuroforaminal stenosis cervical spine I suppose are possible etiologies  History of brief episodes of tingling in the left hand into the fourth and fingers of the right hand-May be secondary to bilateral cervical radiculopathies secondary to central canal stenosis cervical spine, bilateral neuroforaminal stenosis cervical spine  Plan  Obtain x-rays of the cervical spine today.  Continue meloxicam but at a dose of 7.5 mg p.o. twice daily.  Begin cyclobenzaprine 5 mg p.o. nightly and every 8 hours as needed during the day  I told the patient to continue application of Voltaren gel to the neck every 6 hours as needed.  The patient may benefit from a physical therapy referral

## 2024-10-11 DIAGNOSIS — Z00.00 ROUTINE GENERAL MEDICAL EXAMINATION AT A HEALTH CARE FACILITY: Primary | ICD-10-CM

## 2024-10-11 DIAGNOSIS — R00.2 PALPITATIONS: ICD-10-CM

## 2024-10-11 DIAGNOSIS — Z90.89 HISTORY OF PARATHYROIDECTOMY: ICD-10-CM

## 2024-10-11 DIAGNOSIS — Z86.39 HISTORY OF HYPERPARATHYROIDISM: ICD-10-CM

## 2024-10-11 DIAGNOSIS — Z98.890 HISTORY OF PARATHYROIDECTOMY: ICD-10-CM

## 2024-10-14 ENCOUNTER — LAB (OUTPATIENT)
Dept: LAB | Facility: LAB | Age: 69
End: 2024-10-14
Payer: COMMERCIAL

## 2024-10-14 DIAGNOSIS — Z98.890 HISTORY OF PARATHYROIDECTOMY: ICD-10-CM

## 2024-10-14 DIAGNOSIS — Z90.89 HISTORY OF PARATHYROIDECTOMY: ICD-10-CM

## 2024-10-14 DIAGNOSIS — Z86.39 HISTORY OF HYPERPARATHYROIDISM: ICD-10-CM

## 2024-10-14 DIAGNOSIS — Z00.00 ROUTINE GENERAL MEDICAL EXAMINATION AT A HEALTH CARE FACILITY: ICD-10-CM

## 2024-10-14 DIAGNOSIS — R00.2 PALPITATIONS: ICD-10-CM

## 2024-10-14 LAB
25(OH)D3 SERPL-MCNC: 60 NG/ML (ref 30–100)
ALBUMIN SERPL BCP-MCNC: 4.2 G/DL (ref 3.4–5)
ALP SERPL-CCNC: 72 U/L (ref 33–136)
ALT SERPL W P-5'-P-CCNC: 13 U/L (ref 7–45)
ANION GAP SERPL CALC-SCNC: 12 MMOL/L (ref 10–20)
AST SERPL W P-5'-P-CCNC: 13 U/L (ref 9–39)
BASOPHILS # BLD AUTO: 0.07 X10*3/UL (ref 0–0.1)
BASOPHILS NFR BLD AUTO: 1 %
BILIRUB SERPL-MCNC: 0.8 MG/DL (ref 0–1.2)
BUN SERPL-MCNC: 22 MG/DL (ref 6–23)
CALCIUM SERPL-MCNC: 9.4 MG/DL (ref 8.6–10.6)
CHLORIDE SERPL-SCNC: 104 MMOL/L (ref 98–107)
CHOLEST SERPL-MCNC: 171 MG/DL (ref 0–199)
CHOLESTEROL/HDL RATIO: 3.5
CO2 SERPL-SCNC: 29 MMOL/L (ref 21–32)
CREAT SERPL-MCNC: 0.79 MG/DL (ref 0.5–1.05)
CRP SERPL HS-MCNC: 4.1 MG/L
EGFRCR SERPLBLD CKD-EPI 2021: 81 ML/MIN/1.73M*2
EOSINOPHIL # BLD AUTO: 0.14 X10*3/UL (ref 0–0.7)
EOSINOPHIL NFR BLD AUTO: 2.1 %
ERYTHROCYTE [DISTWIDTH] IN BLOOD BY AUTOMATED COUNT: 13.1 % (ref 11.5–14.5)
GLUCOSE SERPL-MCNC: 90 MG/DL (ref 74–99)
HCT VFR BLD AUTO: 44 % (ref 36–46)
HCV AB SER QL: NONREACTIVE
HDLC SERPL-MCNC: 48.8 MG/DL
HGB BLD-MCNC: 14.4 G/DL (ref 12–16)
IMM GRANULOCYTES # BLD AUTO: 0.01 X10*3/UL (ref 0–0.7)
IMM GRANULOCYTES NFR BLD AUTO: 0.1 % (ref 0–0.9)
LDLC SERPL CALC-MCNC: 102 MG/DL
LYMPHOCYTES # BLD AUTO: 1.95 X10*3/UL (ref 1.2–4.8)
LYMPHOCYTES NFR BLD AUTO: 29 %
MCH RBC QN AUTO: 28.1 PG (ref 26–34)
MCHC RBC AUTO-ENTMCNC: 32.7 G/DL (ref 32–36)
MCV RBC AUTO: 86 FL (ref 80–100)
MONOCYTES # BLD AUTO: 0.56 X10*3/UL (ref 0.1–1)
MONOCYTES NFR BLD AUTO: 8.3 %
NEUTROPHILS # BLD AUTO: 4 X10*3/UL (ref 1.2–7.7)
NEUTROPHILS NFR BLD AUTO: 59.5 %
NON HDL CHOLESTEROL: 122 MG/DL (ref 0–149)
NRBC BLD-RTO: 0 /100 WBCS (ref 0–0)
PLATELET # BLD AUTO: 274 X10*3/UL (ref 150–450)
POTASSIUM SERPL-SCNC: 4.5 MMOL/L (ref 3.5–5.3)
PROT SERPL-MCNC: 7.1 G/DL (ref 6.4–8.2)
PTH-INTACT SERPL-MCNC: 52.1 PG/ML (ref 18.5–88)
RBC # BLD AUTO: 5.13 X10*6/UL (ref 4–5.2)
SODIUM SERPL-SCNC: 140 MMOL/L (ref 136–145)
TRIGL SERPL-MCNC: 101 MG/DL (ref 0–149)
VIT B12 SERPL-MCNC: 676 PG/ML (ref 211–911)
VLDL: 20 MG/DL (ref 0–40)
WBC # BLD AUTO: 6.7 X10*3/UL (ref 4.4–11.3)

## 2024-10-14 PROCEDURE — 82607 VITAMIN B-12: CPT

## 2024-10-14 PROCEDURE — 82306 VITAMIN D 25 HYDROXY: CPT

## 2024-10-14 PROCEDURE — 85025 COMPLETE CBC W/AUTO DIFF WBC: CPT

## 2024-10-14 PROCEDURE — 83970 ASSAY OF PARATHORMONE: CPT

## 2024-10-14 PROCEDURE — 86803 HEPATITIS C AB TEST: CPT

## 2024-10-14 PROCEDURE — 86141 C-REACTIVE PROTEIN HS: CPT

## 2024-10-14 PROCEDURE — 80053 COMPREHEN METABOLIC PANEL: CPT

## 2024-10-14 PROCEDURE — 80061 LIPID PANEL: CPT

## 2024-10-14 PROCEDURE — 36415 COLL VENOUS BLD VENIPUNCTURE: CPT

## 2024-10-21 ASSESSMENT — PROMIS GLOBAL HEALTH SCALE
RATE_PHYSICAL_HEALTH: GOOD
EMOTIONAL_PROBLEMS: NEVER
RATE_MENTAL_HEALTH: VERY GOOD
RATE_SOCIAL_SATISFACTION: EXCELLENT
CARRYOUT_SOCIAL_ACTIVITIES: EXCELLENT
RATE_GENERAL_HEALTH: GOOD
RATE_AVERAGE_PAIN: 3
RATE_QUALITY_OF_LIFE: VERY GOOD
CARRYOUT_PHYSICAL_ACTIVITIES: MOSTLY

## 2024-10-22 NOTE — PROGRESS NOTES
Subjective   Reason for Visit: Cierra Atkins is an 69 y.o. female here for a Medicare Wellness visit.               HPI  Since her last office visit, the patient reports continued constant soreness noted over the posterior surface of the neck.  She reports an increase in the intensity of the soreness when she wakes up in the morning.  She also reports an increase in the intensity of the soreness with increased activity and when lifting her grandson..  She reported a  dramatic decrease in the intensity of the soreness recently when she had a massage.  She reports no radiation of discomfort.  Since her last office visit, she reports no episodes of a zinging discomfort in the left trapezius region.  She reports that over the past 5 weeks, she has noted no change in the frequency of soreness but reports a significant decrease in the intensity of the soreness.    Patient reports a history of intermittent episodes of discomfort in the right knee times years.  She reports that the episodes can be precipitated by the patient walking 2-3 miles and walking up steep hills.  She reports no associated swelling or instability.  She reports no preceding trauma.  No other associated symptoms.    Over the past year, the patient reports experiencing only 1 episode of abdominal distention, alternating consistencies of stool.  She reports that the episode occurred for a period of a few days in July and was associated with increased caffeine intake.    Over the past year, the patient reports continued chronic intermittent episodes of pressure or sharp pain located in the right cheek as well as the right retro-orbital region greater than the left cheek and left retro-orbital region.  She still reports that the episodes can be precipitated by humidity changes, stress, lack of sleep.  She reports that over the past year the duration of each episode has been approximately 2 days.  She reports no other associated symptoms.  Over the past  year, the patient has on average been experiencing 4 episodes per month.  She remains on Nurtec 75 mg daily as needed.    No other new complaints.  Patient Care Team:  Amol Palacio MD as PCP - General  [facility] Pal[facility]     Review of Systems  All systems have been reviewed and are normal except as previously noted  Objective   Vitals:  There were no vitals taken for this visit.      Physical Exam  Head - palpation revealed no tenderness over the maxillary or frontal sinuses.  Eyes - extraocular movements intact pupils equal and reactive to light; fundi revealed good retinal color no hemorrhages or exudates.  Ears - palpation of pinnas and traguses revealed no tenderness. External auditory canals not erythematous or swollen. The canals are narrow however. Right TM not visualized secondary to impacted cerumen. Left TM not visualized  secondary to narrow external auditory canal  Nose - turbinates not erythematous or swollen; nasal septal deviation noted to the right.  Mouth - posterior pharynx is not erythematous or swollen. Tonsillar pillars appeared normal no exudates.  Neck - no lymphadenopathy. Thyroid gland not enlarged. No bruits.  Espvoxp-jioqdufk-[id number] obtained April 29, 2024  Lungs - clear to auscultation bilaterally.  Cardiac - , rate normal, rhythm regular, no murmurs, no JVD  Abdomen - soft nondistended,, hypoactive bowel sounds. There is a 2 x 2 centimeter mass in the umbilicus. Palpation revealed no tenderness or increase in warmth.; no tenderness or masses noted throughout the rest of the abdomen;   Pulses - 2+ bilaterally except dorsalis pedis pulse right foot-0  Extremities - no peripheral edema.  Musculoskeletal:  Cervical spine-no erythema or swelling.  Full range of motion with increased soreness in all directions of motion.  Palpation of the trapezius regions revealed decreased intensity of soreness, no increase in warmth  CMC joint of the left thumb-no erythema or  swelling.  Full range of motion with pain noted on flexion and extension.  Full range of motion with no pain noted in all other directions of motion.  Palpation revealed tenderness but no increase in warmth.  CMC joint of the right thumb-no erythema or swelling.  Full range of motion in all directions of motion with no pain.  Palpation did reveal tenderness but no increase in warmth    Hands-   Heberden's nodes are noted on the second through the fourth fingers of both hands.  Full range of motion in all joints in all directions of motion with no pain. Palpation revealed no tenderness or increase in warmth.  Right knee-no erythema or swelling.  Full range of motion in all directions of motion with no pain.  Palpation did reveal crepitus but no tenderness or increase in warmth.  Negative Lachemann's test, negative Shayy's test, negative patellar apprehension test, no pain or laxity of the collateral ligaments noted.  Skin - Diffuse xerosis noted. Spider veins noted along the medial surface right thigh. Few varicose veins noted in the lower legs bilaterally.      Neurologic  Mental status-alert and oriented x3   Cranial nerves-2 through 12 grossly intact, no visual field abnormalities  Motor-no pronator drift noted, strength-5/5 in all muscle groups tested, , no tremor noted.  No bradykinesia noted.  No rigidity noted.  Negative pull test  Sensory-Light touch sensation fully intact  Pinprick sensation fully intact  Vibratory sensation fully intact  Cerebellar-no truncal ataxia, good coordination finger-nose testing,, good coordination heel-to-shin testing, normal rapid alternating movements  Romberg negative, no abnormality in tandem gait  Reflexes-1+/4 bilaterally  Assessment & Plan  Routine general medical examination at a health care facility    Orders:    Thyroid Stimulating Hormone; Future    Hemoglobin A1C; Future    ECG 12 lead (Clinic Performed)    POCT UA (nonautomated) manually resulted    History of  hyperparathyroidism         Hx of migraine headaches         Allergic rhinitis, unspecified seasonality, unspecified trigger         Atrophic vaginitis         Gastroesophageal reflux disease without esophagitis         History of parathyroidectomy         Anxiety         Elevated high sensitivity C-reactive protein    Orders:    C-Reactive Protein, High Sensitivity; Future    Encounter for immunization    Orders:    Flu vaccine, trivalent, preservative free, age 6 months and greater (Fluarix/Fluzone/Flulaval)    Leukocytes in urine    Orders:    Urine Culture; Future    Urine Culture    Acquired hypothyroidism    Orders:    Thyroid Stimulating Hormone; Future            Assessment  Noncardiac chest pain  Benign palpitations  COVID-19 September 2022, September 23, 2023.    Chronic intermittent generalized abdominal distention-may be secondary to flare of irritable bowel syndrome   Chronic alternating consistencies of stool-May be secondary to flare of irritable bowel syndrome  Chronic presence of a mass in the umbilical region-likely represents an umbilical hernia  Chronic constipation   Irritable bowel syndrome   Hemorrhoids  Cervical dysplasia  Mild tendinopathy right supraspinatus tendon  Hill-Sachs deformity right shoulder.  Chronic intermittent episodes of throbbing pain in the CMC joints of the thumbs increasing with activity-probably secondary to osteoarthritis   Osteoarthritis of the CMC joints of the thumbs  Intermittent episodes of discomfort in the right knee-probably secondary to osteoarthritis  Osteopenia  Hypothyroidism secondary to left thyroid lobectomy 2002.  Mild primary hyperparathyroidism status post parathyroidectomy 2013; status post parathyroidectomy with exploration March 23, 2023  Chronic presence of area of hyperkeratosis noted over the medial surface of the right second toe-with associated pain at the site after periods of long walking-unsure of etiology.  Bilateral cataracts  Vitreous  degeneration of both eyes   Nodular corneal degeneration of both eyes  Chronic intermittent episodes of pressure or sharp pain located in the right cheek as well as the right retro-orbital region-greater than the left cheek and left retro-orbital region probably represent migraine headaches...  Migraine headaches  Continued constant soreness noted over the posterior surface of the neck--may be secondary to osteoarthritis and degenerative disc disease of cervical spine.  Osteoarthritis and degenerative disc disease of the cervical spine.  Left-sided neuroforaminal stenosis cervical spine.  Osteoarthritis of the lumbosacral spine.  Anxiety     Plan  Obtain EKG and urinalysis today..  Obtain hemoglobin A1c, TSH today  Administer 1 dose of flu vaccine today.  I will refer the patient for massotherapy.  I also have recommended that she apply Voltaren gel to painful regions every 6 hours as needed and that she continue use of meloxicam 7.5 mg p.o. twice daily as needed.  She will continue all of her other current medications and she will return for an annual Medicare wellness visit in 1 year

## 2024-10-23 ENCOUNTER — APPOINTMENT (OUTPATIENT)
Dept: PRIMARY CARE | Facility: CLINIC | Age: 69
End: 2024-10-23
Payer: COMMERCIAL

## 2024-10-23 ENCOUNTER — LAB (OUTPATIENT)
Dept: LAB | Facility: LAB | Age: 69
End: 2024-10-23
Payer: COMMERCIAL

## 2024-10-23 VITALS
WEIGHT: 151.8 LBS | HEIGHT: 63 IN | DIASTOLIC BLOOD PRESSURE: 70 MMHG | SYSTOLIC BLOOD PRESSURE: 100 MMHG | HEART RATE: 70 BPM | BODY MASS INDEX: 26.9 KG/M2

## 2024-10-23 DIAGNOSIS — F41.9 ANXIETY: ICD-10-CM

## 2024-10-23 DIAGNOSIS — Z00.00 ROUTINE GENERAL MEDICAL EXAMINATION AT A HEALTH CARE FACILITY: Primary | ICD-10-CM

## 2024-10-23 DIAGNOSIS — Z00.00 ROUTINE GENERAL MEDICAL EXAMINATION AT A HEALTH CARE FACILITY: ICD-10-CM

## 2024-10-23 DIAGNOSIS — R79.82 ELEVATED HIGH SENSITIVITY C-REACTIVE PROTEIN: ICD-10-CM

## 2024-10-23 DIAGNOSIS — Z86.69 HX OF MIGRAINE HEADACHES: ICD-10-CM

## 2024-10-23 DIAGNOSIS — Z98.890 HISTORY OF PARATHYROIDECTOMY: ICD-10-CM

## 2024-10-23 DIAGNOSIS — E03.9 ACQUIRED HYPOTHYROIDISM: ICD-10-CM

## 2024-10-23 DIAGNOSIS — K21.9 GASTROESOPHAGEAL REFLUX DISEASE WITHOUT ESOPHAGITIS: ICD-10-CM

## 2024-10-23 DIAGNOSIS — Z86.39 HISTORY OF HYPERPARATHYROIDISM: ICD-10-CM

## 2024-10-23 DIAGNOSIS — Z90.89 HISTORY OF PARATHYROIDECTOMY: ICD-10-CM

## 2024-10-23 DIAGNOSIS — N95.2 ATROPHIC VAGINITIS: ICD-10-CM

## 2024-10-23 DIAGNOSIS — J30.9 ALLERGIC RHINITIS, UNSPECIFIED SEASONALITY, UNSPECIFIED TRIGGER: ICD-10-CM

## 2024-10-23 DIAGNOSIS — R82.998 LEUKOCYTES IN URINE: ICD-10-CM

## 2024-10-23 DIAGNOSIS — Z23 ENCOUNTER FOR IMMUNIZATION: ICD-10-CM

## 2024-10-23 LAB
CRP SERPL HS-MCNC: 3.3 MG/L
EST. AVERAGE GLUCOSE BLD GHB EST-MCNC: 108 MG/DL
HBA1C MFR BLD: 5.4 %
POC APPEARANCE, URINE: ABNORMAL
POC BILIRUBIN, URINE: NEGATIVE
POC BLOOD, URINE: ABNORMAL
POC COLOR, URINE: ABNORMAL
POC GLUCOSE, URINE: NEGATIVE MG/DL
POC KETONES, URINE: NEGATIVE MG/DL
POC LEUKOCYTES, URINE: ABNORMAL
POC NITRITE,URINE: NEGATIVE
POC PH, URINE: 5.5 PH
POC PROTEIN, URINE: NEGATIVE MG/DL
POC SPECIFIC GRAVITY, URINE: 1.01
POC UROBILINOGEN, URINE: 0.2 EU/DL

## 2024-10-23 PROCEDURE — 84443 ASSAY THYROID STIM HORMONE: CPT

## 2024-10-23 PROCEDURE — 99213 OFFICE O/P EST LOW 20 MIN: CPT | Performed by: INTERNAL MEDICINE

## 2024-10-23 PROCEDURE — 3008F BODY MASS INDEX DOCD: CPT | Performed by: INTERNAL MEDICINE

## 2024-10-23 PROCEDURE — 87086 URINE CULTURE/COLONY COUNT: CPT

## 2024-10-23 PROCEDURE — 90471 IMMUNIZATION ADMIN: CPT | Performed by: INTERNAL MEDICINE

## 2024-10-23 PROCEDURE — 83036 HEMOGLOBIN GLYCOSYLATED A1C: CPT

## 2024-10-23 PROCEDURE — 1160F RVW MEDS BY RX/DR IN RCRD: CPT | Performed by: INTERNAL MEDICINE

## 2024-10-23 PROCEDURE — G0439 PPPS, SUBSEQ VISIT: HCPCS | Performed by: INTERNAL MEDICINE

## 2024-10-23 PROCEDURE — 86141 C-REACTIVE PROTEIN HS: CPT

## 2024-10-23 PROCEDURE — 81002 URINALYSIS NONAUTO W/O SCOPE: CPT | Performed by: INTERNAL MEDICINE

## 2024-10-23 PROCEDURE — 90656 IIV3 VACC NO PRSV 0.5 ML IM: CPT | Performed by: INTERNAL MEDICINE

## 2024-10-23 PROCEDURE — 1159F MED LIST DOCD IN RCRD: CPT | Performed by: INTERNAL MEDICINE

## 2024-10-23 PROCEDURE — 36415 COLL VENOUS BLD VENIPUNCTURE: CPT

## 2024-10-23 PROCEDURE — 1170F FXNL STATUS ASSESSED: CPT | Performed by: INTERNAL MEDICINE

## 2024-10-23 PROCEDURE — 93000 ELECTROCARDIOGRAM COMPLETE: CPT | Performed by: INTERNAL MEDICINE

## 2024-10-23 ASSESSMENT — ACTIVITIES OF DAILY LIVING (ADL)
DOING_HOUSEWORK: INDEPENDENT
GROCERY_SHOPPING: INDEPENDENT
TAKING_MEDICATION: INDEPENDENT
BATHING: INDEPENDENT
MANAGING_FINANCES: INDEPENDENT
DRESSING: INDEPENDENT

## 2024-10-23 ASSESSMENT — ENCOUNTER SYMPTOMS
DEPRESSION: 0
OCCASIONAL FEELINGS OF UNSTEADINESS: 0
LOSS OF SENSATION IN FEET: 0

## 2024-10-24 LAB
BACTERIA UR CULT: NORMAL
TSH SERPL-ACNC: 1.52 MIU/L (ref 0.44–3.98)

## 2024-10-25 LAB — TSH SERPL-ACNC: 1.56 MIU/L (ref 0.44–3.98)

## 2024-11-01 ENCOUNTER — HOSPITAL ENCOUNTER (OUTPATIENT)
Dept: RADIOLOGY | Facility: CLINIC | Age: 69
Discharge: HOME | End: 2024-11-01
Payer: COMMERCIAL

## 2024-11-01 ENCOUNTER — OFFICE VISIT (OUTPATIENT)
Dept: PRIMARY CARE | Facility: CLINIC | Age: 69
End: 2024-11-01
Payer: COMMERCIAL

## 2024-11-01 VITALS — SYSTOLIC BLOOD PRESSURE: 108 MMHG | DIASTOLIC BLOOD PRESSURE: 84 MMHG | HEART RATE: 80 BPM

## 2024-11-01 DIAGNOSIS — R09.89 CHEST CONGESTION: Primary | ICD-10-CM

## 2024-11-01 DIAGNOSIS — Z86.69 HX OF MIGRAINE HEADACHES: ICD-10-CM

## 2024-11-01 DIAGNOSIS — R09.89 CHEST CONGESTION: ICD-10-CM

## 2024-11-01 DIAGNOSIS — J32.0 CHRONIC MAXILLARY SINUSITIS: ICD-10-CM

## 2024-11-01 DIAGNOSIS — S22.059A: Primary | ICD-10-CM

## 2024-11-01 PROCEDURE — 71046 X-RAY EXAM CHEST 2 VIEWS: CPT

## 2024-11-01 PROCEDURE — 99213 OFFICE O/P EST LOW 20 MIN: CPT | Performed by: INTERNAL MEDICINE

## 2024-11-01 RX ORDER — DOXYCYCLINE 100 MG/1
100 CAPSULE ORAL 2 TIMES DAILY
Qty: 20 CAPSULE | Refills: 0 | Status: SHIPPED | OUTPATIENT
Start: 2024-11-01 | End: 2024-11-11

## 2024-11-04 ENCOUNTER — HOSPITAL ENCOUNTER (OUTPATIENT)
Dept: RADIOLOGY | Facility: CLINIC | Age: 69
Discharge: HOME | End: 2024-11-04
Payer: COMMERCIAL

## 2024-11-04 DIAGNOSIS — S22.059A: ICD-10-CM

## 2024-11-04 PROCEDURE — 72072 X-RAY EXAM THORAC SPINE 3VWS: CPT

## 2024-11-04 PROCEDURE — 72072 X-RAY EXAM THORAC SPINE 3VWS: CPT | Performed by: STUDENT IN AN ORGANIZED HEALTH CARE EDUCATION/TRAINING PROGRAM

## 2024-11-07 ENCOUNTER — TELEPHONE (OUTPATIENT)
Dept: PRIMARY CARE | Facility: CLINIC | Age: 69
End: 2024-11-07
Payer: COMMERCIAL

## 2024-11-07 DIAGNOSIS — M84.48XA PATHOLOGICAL FRACTURE OF THORACIC VERTEBRA, INITIAL ENCOUNTER: Primary | ICD-10-CM

## 2024-11-18 ENCOUNTER — APPOINTMENT (OUTPATIENT)
Dept: RADIOLOGY | Facility: HOSPITAL | Age: 69
End: 2024-11-18
Payer: COMMERCIAL

## 2024-11-19 DIAGNOSIS — S22.059B: Primary | ICD-10-CM

## 2024-11-25 ENCOUNTER — OFFICE VISIT (OUTPATIENT)
Dept: ORTHOPEDIC SURGERY | Facility: CLINIC | Age: 69
End: 2024-11-25
Payer: COMMERCIAL

## 2024-11-25 DIAGNOSIS — M89.9 LESION OF BONE OF THORACIC SPINE: Primary | ICD-10-CM

## 2024-11-25 PROCEDURE — 99203 OFFICE O/P NEW LOW 30 MIN: CPT | Performed by: STUDENT IN AN ORGANIZED HEALTH CARE EDUCATION/TRAINING PROGRAM

## 2024-11-25 PROCEDURE — 1159F MED LIST DOCD IN RCRD: CPT | Performed by: STUDENT IN AN ORGANIZED HEALTH CARE EDUCATION/TRAINING PROGRAM

## 2024-11-25 PROCEDURE — 99213 OFFICE O/P EST LOW 20 MIN: CPT | Performed by: STUDENT IN AN ORGANIZED HEALTH CARE EDUCATION/TRAINING PROGRAM

## 2024-11-25 ASSESSMENT — PAIN - FUNCTIONAL ASSESSMENT: PAIN_FUNCTIONAL_ASSESSMENT: 0-10

## 2024-11-25 ASSESSMENT — ENCOUNTER SYMPTOMS
LOSS OF SENSATION IN FEET: 0
OCCASIONAL FEELINGS OF UNSTEADINESS: 0
DEPRESSION: 0

## 2024-11-25 ASSESSMENT — PAIN SCALES - GENERAL: PAINLEVEL_OUTOF10: 0 - NO PAIN

## 2024-11-25 NOTE — PROGRESS NOTES
Orthopaedic Spine Surgery Clinic Note    Patient ID: Cierra Atkins is a 69 y.o. female.    Chief Complaint  Thoracic lesion    History of Present Illness  Ms. Atkins is a pleasant 69-year-old female who presents for initial evaluation of an incidentally found thoracic lesion.  Patient states that back in October 2024 she had an upper respiratory tract infection that ultimately prompted a chest x-ray with her primary care doctor, Dr. Palacio.  This incidentally noted a blastic lesion at T5.  The subsequent workup for this ultimately led to an MRI which she had done at an outside imaging facility.  This discovered a cystic lesion at T5 that ultimately prompted her referral to our office.    Patient denies any symptoms with regards to her mid back.  She states that approximately 6 weeks ago she sustained a fall when she fell.  She had some increased pain in her mid back in addition to her neck at that time, however this improved.  This also seemingly strained her left anterior hip.  She also notes some chronic issues with paresthesias of her left to ulnar digits.  Apart from that, she denies any fevers, chills, night sweats, unintended weight loss.  Denies bowel or bladder control issues.    Patient is scheduled to travel to [place] next week for the winter.    Prior treatments:  She has taken meloxicam and muscle relaxants for her prior back pain.    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) 10/02/2015    Pap smear abnormality of cervix with LGSIL    Mammary duct ectasia of unspecified breast     Mammary duct ectasia    Personal history of  diseases of the skin and subcutaneous tissue 09/25/2017    History of cyst of breast    Personal history of other diseases of the digestive system 10/17/2022    History of irritable bowel syndrome    Personal history of other diseases of the nervous system and sense organs 11/30/2016    History of migraine headaches    Personal history of other endocrine, nutritional and metabolic disease 12/20/2022    History of hyperparathyroidism       Past Surgical History:   Procedure Laterality Date    BREAST BIOPSY  08/28/2013    Biopsy Breast Open    BREAST BIOPSY  10/23/2013    Biopsy Breast Open    BREAST SURGERY  08/28/2013    Breast Surgery    CERVICAL BIOPSY  W/ LOOP ELECTRODE EXCISION  09/25/2017    Cervical Loop Electrosurgical Excision (LEEP)    DILATION AND CURETTAGE OF UTERUS  08/28/2013    Dilation And Curettage    HAND SURGERY  10/23/2013    Hand Surgery                                                                                                                                                          HERNIA REPAIR  08/28/2013    Inguinal Hernia Repair    OTHER SURGICAL HISTORY  08/28/2013    Parathyroid Complete Parathyroidectomy    OTHER SURGICAL HISTORY  08/28/2013    Hand Synovectomy Of Carpometacarpal Joint    OTHER SURGICAL HISTORY  08/28/2013    General Surgery    OTHER SURGICAL HISTORY  08/14/2015    Thyroid Surgery Substernal Thyroidectomy Partial    OTHER SURGICAL HISTORY  02/25/2015    Parathyroid Complete Parathyroidectomy       Social History     Socioeconomic History    Marital status:    Tobacco Use    Smoking status: Never     Passive exposure: Never    Smokeless tobacco: Never   Substance and Sexual Activity    Alcohol use: Not Currently    Drug use: Never       Family History   Problem Relation Name Age of Onset    Pancreatic cancer Mother      Breast cancer Mother  80    Pancreatic cancer Father      Other (Seizure disorder) Daughter         Allergies   Allergen Reactions    Morphine  Other    Penicillins Hives    Risedronate Sodium Swelling    Sulfa (Sulfonamide Antibiotics) Headache       Current Outpatient Medications   Medication Instructions    atenolol (TENORMIN) 50 mg, oral, Daily    cholecalciferol (VITAMIN D-3) 50 mcg, Daily    cyclobenzaprine (FLEXERIL) 5 mg, oral, 3 times daily PRN    hydrocortisone 2.5 % cream Topical, 2 times daily    hydrOXYzine pamoate (VISTARIL) 25 mg, oral, Every 6 hours PRN    levothyroxine (SYNTHROID, LEVOXYL) 100 mcg, oral, Daily    magnesium oxide (Mag-Ox) 400 mg (241.3 mg magnesium) tablet 1 tablet, Daily    meloxicam (MOBIC) 7.5 mg, oral, 2 times daily    Nurtec ODT 75 mg, oral, Daily    peppermint oiL (IBgard) 90 mg capsule,delayed,extend.release Take by mouth.         Vitals & Measurements  There were no vitals filed for this visit.     Ortho Exam  General: AO x 3, NAD  Cardio: examined extremities perfused by peripheral palpation  Resp: breathing unlabored  Gait: within normal limits, non-antalgic  Tenderness: no tenderness to palpation of the thoracic spine    Lower Extremity  Right  Left  IP   5/5  5/5  Quadriceps  5/5  5/5  Tibialis anterior  5/5  5/5  EHL   5/5  5/5  Gastrocnemius  5/5  5/5    Sensation: Normal to light touch throughout lower extremities bilaterally.    Reflexes:  Right   Left  Q  2+  2+  A   2+   2+    Clonus: negative  Babinski: WNL  Straight leg raise: negative        Diagnostic Results - Imaging    XR thoracic spine, 11/4/2024  FINDINGS:  Three views of the thoracic spine.      No acute fracture. No focal subluxation. Mild multilevel discogenic  degenerative change of the lower cervical and midthoracic spine.  Similar appearance of a sclerotic lesion of T5 on the left.      IMPRESSION:  Similar sclerotic lesion on the left side of T5. CT or MRI is  recommended for further evaluation.        MRI thoracic spine ([facility])  No report available.  Overall this is a poor quality MRI.  On sagittal profile there is a mixed cystic, sclerotic  lesion at the T5 vertebral body that obeys the cortical bone borders without extension into the posterior canal or the anterior tissues.  On axial view, there is appreciation of a primarily right sided anterolateral cystic region in which there is signal intensity that matches that of the cerebrospinal fluid posteriorly.  On the left side there is a more hypointense area.      Diagnosis  Encounter Diagnosis   Name Primary?    Lesion of bone of thoracic spine Yes          Assessment/Plan  Ms. Atkins is a pleasant 69-year-old female who presents for initial evaluation of an incidentally discovered T5 vertebral body lesion.  This was discovered on a chest x-ray that was obtained last month during evaluation of a URI.  She subsequently had MRI evaluation.  The outside MRI images are available, unfortunately the report is not readily available in the system.  This is unfortunately a poor quality MRI.  However what is appreciable is a mixed cystic and sclerotic lesion within the T5 vertebral body that tends to obey the cortices of the vertebral body and the pedicles posteriorly.  The cystic cavity has signal intensity that mirrors the CSF signal posteriorly within the canal.  Although the appearance of the lesion appears benign given its obedience of the cortices, it does have an atypical appearance.  Possibly an atypical hemangioma.    The patient has no locoregional symptoms, nor does she have any systemic symptoms that would drop concern for malignancy.  However given the atypical appearance, I explained to the patient that it would be wise to comprehensively evaluate this lesion.  I will first communicate with our neuroradiology team for a formal read of these images to attain a radiographic differential.  I will then discuss in a multidisciplinary fashion with the primary care and radiology teams if a biopsy of this area would be advisable.  The patient is scheduled to travel to [place] next week.  I explained that  I would keep her up-to-date on all developments via [email or web address].  We will stay in active communication with her primary care team as we continue to evaluate this lesion.    Red flag symptoms that would warrant more urgent communication with my office or presentation to the emergency department were discussed.  She understood all of the above.  All of her questions were answered.      No orders of the defined types were placed in this encounter.      --    Jimmy Walker MD  Orthopaedic Spine Surgery  , Department of Orthopaedic Surgery  [facility]

## 2024-11-26 ENCOUNTER — HOSPITAL ENCOUNTER (OUTPATIENT)
Dept: RADIOLOGY | Facility: EXTERNAL LOCATION | Age: 69
Discharge: HOME | End: 2024-11-26

## 2024-11-27 ENCOUNTER — TELEPHONE (OUTPATIENT)
Dept: ORTHOPEDIC SURGERY | Facility: CLINIC | Age: 69
End: 2024-11-27
Payer: COMMERCIAL

## 2024-11-27 DIAGNOSIS — M89.9 LESION OF BONE OF THORACIC SPINE: Primary | ICD-10-CM

## 2024-11-27 NOTE — TELEPHONE ENCOUNTER
Called Ms. Atkins regarding her thoracic lesion. I reached out to our neuroradiology department regarding this T5 mixed cystic-sclerotic lesion on MRI. According to their interpretation, this sclerotic trabecular pattern has existed in some CXR radiographs that she has had dating back to 2016, indicating some element of chronicity. 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 There are no aggressive features (cortical breakthrough/erosion, soft-tissue expansion) as can be appreciated on the limited quality MRI that we have.     I had an extensive discussion with the patient's PCP, Dr. Palacio. We could consider biopsy to confirm, but with sampling error there are also risks of excessive bleeding and epidural hematoma. To that end, it seems the risks may outweight the benefits. We could also consider PET/CT to more definitively diagnosis radiologically if there was concern for something neoplastic/metastatic mimicking this. 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     In our research, most literature advocates for serial exams/imaging to track with procedural intervention only if we see any aggressive features (cortical erosion, soft tissue expansion, etc). Since Cierra is leaving for [place] soon, we will plan on having her come back to [place] in approximately 3 months. We will plan on a CT scan of the thoracic spine in addition to a clinical check with my office. I did ask her to keep inventory of any systemic or neurologic symptoms, including fever, chills, night sweats, unintended weight loss, lower extremity weakness/numbness/parasthesias, balance/incoordination issues, severe mid-thoracic back pain, or bowel/bladder control issues.     She was in agreement with the plan. She was gracious for our follow-up and attention to  detail. She will follow-up per above and keep us posted otherwise.     --    Jimmy Walker MD  Orthopaedic Spine Surgery  , Department of Orthopaedic Surgery  [facility]

## 2024-12-21 DIAGNOSIS — F41.9 ANXIETY: ICD-10-CM

## 2024-12-21 DIAGNOSIS — R00.2 PALPITATIONS: ICD-10-CM

## 2024-12-21 DIAGNOSIS — Z86.69 HX OF MIGRAINE HEADACHES: ICD-10-CM

## 2024-12-23 RX ORDER — ATENOLOL 50 MG/1
50 TABLET ORAL DAILY
Qty: 90 TABLET | Refills: 2 | Status: SHIPPED | OUTPATIENT
Start: 2024-12-23

## 2024-12-23 RX ORDER — LEVOTHYROXINE SODIUM 100 UG/1
100 TABLET ORAL DAILY
Qty: 90 TABLET | Refills: 3 | Status: SHIPPED | OUTPATIENT
Start: 2024-12-23

## 2025-01-22 DIAGNOSIS — Z12.11 COLON CANCER SCREENING: ICD-10-CM

## 2025-01-22 RX ORDER — SODIUM, POTASSIUM,MAG SULFATES 17.5-3.13G
SOLUTION, RECONSTITUTED, ORAL ORAL
Qty: 1 EACH | Refills: 0 | Status: SHIPPED | OUTPATIENT
Start: 2025-01-22

## 2025-02-25 DIAGNOSIS — Z86.69 HX OF MIGRAINE HEADACHES: ICD-10-CM

## 2025-02-25 RX ORDER — RIMEGEPANT SULFATE 75 MG/75MG
75 TABLET, ORALLY DISINTEGRATING ORAL DAILY
Qty: 16 TABLET | Refills: 3 | Status: SHIPPED | OUTPATIENT
Start: 2025-02-25

## 2025-03-12 ENCOUNTER — HOSPITAL ENCOUNTER (OUTPATIENT)
Dept: RADIOLOGY | Facility: CLINIC | Age: 70
Discharge: HOME | End: 2025-03-12
Payer: MEDICARE

## 2025-03-12 DIAGNOSIS — M89.9 LESION OF BONE OF THORACIC SPINE: ICD-10-CM

## 2025-03-12 PROCEDURE — 72128 CT CHEST SPINE W/O DYE: CPT | Performed by: RADIOLOGY

## 2025-03-12 PROCEDURE — 72128 CT CHEST SPINE W/O DYE: CPT

## 2025-03-14 ENCOUNTER — OFFICE VISIT (OUTPATIENT)
Dept: ORTHOPEDIC SURGERY | Facility: CLINIC | Age: 70
End: 2025-03-14
Payer: MEDICARE

## 2025-03-14 DIAGNOSIS — M89.9 LESION OF BONE OF THORACIC SPINE: ICD-10-CM

## 2025-03-14 PROCEDURE — 1159F MED LIST DOCD IN RCRD: CPT | Performed by: STUDENT IN AN ORGANIZED HEALTH CARE EDUCATION/TRAINING PROGRAM

## 2025-03-14 PROCEDURE — 99214 OFFICE O/P EST MOD 30 MIN: CPT | Performed by: STUDENT IN AN ORGANIZED HEALTH CARE EDUCATION/TRAINING PROGRAM

## 2025-03-14 PROCEDURE — G2211 COMPLEX E/M VISIT ADD ON: HCPCS | Performed by: STUDENT IN AN ORGANIZED HEALTH CARE EDUCATION/TRAINING PROGRAM

## 2025-03-14 NOTE — PROGRESS NOTES
Orthopaedic Spine Surgery Clinic Note    Patient ID: Cierra Atkins is a 69 y.o. female.    Chief Complaint  Thoracic lesion    History of Present Illness  Ms. Atkins returns for follow-up evaluation of an incidentally found thoracic lesion.  Since we last saw her in the office, we sent her for a CT scan of her thoracic spine for interval radiographic check.  I also discussed her case with our neuroradiology department, who did find that her T5 mixed cystic-sclerotic lesion did exist in some CXR radiographs that she has had dating back to 2016, indicating some element of chronicity.  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  They discussed with me that there were no aggressive features that could be appreciated on the limited quality MRI that we have from 2024.  I also did have an extensive discussion with the patient's PCP, Dr. Palacio, with whom we discussed the possibility of biopsy to confirm or with interval monitoring with serial radiographic and clinical checks.    Patient presents from [place] where she has been for the last several months since we last saw her in the office.  She denies any significant neurologic symptoms in the form of lower extremity weakness/numbness/paresthesias.  She has been quite ambulatory and walking well.  She does relate some left-sided hip pain with prolonged ambulation that does subside with rest.  She denies any new bowel or bladder control issues.    11/25/2024:  Ms. Atkins is a pleasant 69-year-old female who presents for initial evaluation of an incidentally found thoracic lesion.  Patient states that back in October 2024 she had an upper respiratory tract infection that ultimately prompted a chest x-ray with her primary care doctor, Dr. Palacio.  This incidentally noted a blastic lesion at T5.  The subsequent workup for this ultimately led to an MRI which she had done at an outside imaging facility.  This discovered a cystic lesion at T5 that  ultimately prompted her referral to our office.    Patient denies any symptoms with regards to her mid back.  She states that approximately 6 weeks ago she sustained a fall when she fell.  She had some increased pain in her mid back in addition to her neck at that time, however this improved.  This also seemingly strained her left anterior hip.  She also notes some chronic issues with paresthesias of her left to ulnar digits.  Apart from that, she denies any fevers, chills, night sweats, unintended weight loss.  Denies bowel or bladder control issues.    Patient is scheduled to travel to [place] next week for the winter.    Prior treatments:  She has taken meloxicam and muscle relaxants for her prior back pain.    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) 10/02/2015    Pap smear abnormality of cervix with LGSIL    Mammary duct ectasia of unspecified breast     Mammary duct ectasia    Personal history of diseases of the skin and subcutaneous tissue 09/25/2017    History of cyst of breast    Personal history of other diseases of the digestive system 10/17/2022    History of irritable bowel syndrome    Personal history of other diseases of the nervous system and sense organs 11/30/2016    History of migraine headaches    Personal history of other endocrine, nutritional and metabolic disease 12/20/2022    History of hyperparathyroidism       Past Surgical History:   Procedure Laterality Date    BREAST BIOPSY  08/28/2013    Biopsy Breast Open    BREAST BIOPSY  10/23/2013    Biopsy Breast Open    BREAST SURGERY  08/28/2013    Breast Surgery     CERVICAL BIOPSY  W/ LOOP ELECTRODE EXCISION  09/25/2017    Cervical Loop Electrosurgical Excision (LEEP)    DILATION AND CURETTAGE OF UTERUS  08/28/2013    Dilation And Curettage    HAND SURGERY  10/23/2013    Hand Surgery                                                                                                                                                          HERNIA REPAIR  08/28/2013    Inguinal Hernia Repair    OTHER SURGICAL HISTORY  08/28/2013    Parathyroid Complete Parathyroidectomy    OTHER SURGICAL HISTORY  08/28/2013    Hand Synovectomy Of Carpometacarpal Joint    OTHER SURGICAL HISTORY  08/28/2013    General Surgery    OTHER SURGICAL HISTORY  08/14/2015    Thyroid Surgery Substernal Thyroidectomy Partial    OTHER SURGICAL HISTORY  02/25/2015    Parathyroid Complete Parathyroidectomy       Social History     Socioeconomic History    Marital status:    Tobacco Use    Smoking status: Never     Passive exposure: Never    Smokeless tobacco: Never   Substance and Sexual Activity    Alcohol use: Not Currently    Drug use: Never       Family History   Problem Relation Name Age of Onset    Pancreatic cancer Mother      Breast cancer Mother  80    Pancreatic cancer Father      Other (Seizure disorder) Daughter         Allergies   Allergen Reactions    Morphine Other    Penicillins Hives    Risedronate Sodium Swelling    Sulfa (Sulfonamide Antibiotics) Headache       Current Outpatient Medications   Medication Instructions    atenolol (TENORMIN) 50 mg, oral, Daily    cholecalciferol (VITAMIN D-3) 50 mcg, Daily    cyclobenzaprine (FLEXERIL) 5 mg, oral, 3 times daily PRN    hydrocortisone 2.5 % cream Topical, 2 times daily    hydrOXYzine pamoate (VISTARIL) 25 mg, oral, Every 6 hours PRN    levothyroxine (SYNTHROID, LEVOXYL) 100 mcg, oral, Daily    magnesium oxide (Mag-Ox) 400 mg (241.3 mg magnesium) tablet 1 tablet, Daily    meloxicam (MOBIC) 7.5 mg, oral, 2 times daily    Nurtec ODT 75 mg, oral,  Daily    peppermint oiL (IBgard) 90 mg capsule,delayed,extend.release Take by mouth.    sodium,potassium,mag sulfates (Suprep) 17.5-3.13-1.6 gram solution Take one bottle beginning at 6pm night before procedure and then take the other bottle 5 hours before procedure time as directed per instruction sheet         Vitals & Measurements  There were no vitals filed for this visit.     Ortho Exam  General: AO x 3, NAD  Cardio: examined extremities perfused by peripheral palpation  Resp: breathing unlabored  Gait: within normal limits, non-antalgic  Tenderness: no tenderness to palpation of the thoracic spine    Lower Extremity  Right  Left  IP   5/5  5/5  Quadriceps  5/5  5/5  Tibialis anterior  5/5  5/5  EHL   5/5  5/5  Gastrocnemius  5/5  5/5    Sensation: Normal to light touch throughout lower extremities bilaterally.          Diagnostic Results - Imaging    XR thoracic spine, 11/4/2024  FINDINGS:  Three views of the thoracic spine.      No acute fracture. No focal subluxation. Mild multilevel discogenic  degenerative change of the lower cervical and midthoracic spine.  Similar appearance of a sclerotic lesion of T5 on the left.      IMPRESSION:  Similar sclerotic lesion on the left side of T5. CT or MRI is  recommended for further evaluation.        MRI thoracic spine (OS[facility])  No report available.  Overall this is a poor quality MRI.  On sagittal profile there is a mixed cystic, sclerotic lesion at the T5 vertebral body that obeys the cortical bone borders without extension into the posterior canal or the anterior tissues.  On axial view, there is appreciation of a primarily right sided anterolateral cystic region in which there is signal intensity that matches that of the cerebrospinal fluid posteriorly.  On the left side there is a more hypointense area.        CT thoracic spine, 3/12/2025  FINDINGS:  For counting purposes the 1st rib-bearing vertebral body is labeled  T1.      Alignment: Within normal limits.       Vertebrae/Intervertebral Discs:      There is a mixed cystic and sclerotic lesion within the T5 vertebral  body extending into the left T5 pedicle. There is no loss of  vertebral body height or osseous retropulsion into the spinal canal.  Within the confines of a noncontrast examination no extraosseous soft  tissue component is noted. Within the lucent portion of the lesion  there appears to be a Polka dot pattern (image 133 series 204) which  may be seen within a hemangioma. The sclerotic portion appears to  have been present on the prior chest radiograph 10/31/2023.      The thoracic vertebral body heights are intact. The disc spaces are  preserved.      There is no significant spinal canal or neural foraminal stenosis.      Paraspinous Soft Tissues: Prevertebral soft tissues are not thickened.      Incompletely evaluated hypodense lesions associated with the right  kidney may represent cysts. The larger measures 9.2 cm and is  incompletely included within the field of view.      There is a cystic lesion noted within the gallbladder fossa which may  represent a distended gallbladder, incompletely evaluated.          IMPRESSION:  There is a mixed cystic and sclerotic lesion within the T5 vertebral  body extending into the left T5 pedicle. The lesion may represent an  atypical hemangioma, however other lesions such as a metastatic  lesion are not excluded. Given differences in technique this appears  similar to the prior MRI 11/18/2024. If no intervention is performed  consider follow-up imaging to evaluate for stability.      Diagnosis  Encounter Diagnosis   Name Primary?    Lesion of bone of thoracic spine           Assessment/Plan  Ms. Atkins is a pleasant 69-year-old female who presents for follow-up evaluation of an incidentally discovered T5 vertebral body lesion.  This was discovered on a chest x-ray that was obtained in Oct 2024 during evaluation of a URI.  She subsequently had MRI evaluation.  The  outside MRI images are available, unfortunately the report is not readily available in the system.  This is unfortunately a poor quality MRI.  However what is appreciable is a mixed cystic and sclerotic lesion within the T5 vertebral body that tends to obey the cortices of the vertebral body and the pedicles posteriorly.  The cystic cavity has signal intensity that mirrors the CSF signal posteriorly within the canal.  Although the appearance of the lesion appears benign given its obedience of the cortices, it does have an atypical appearance.  Possibly an atypical hemangioma.      I did discuss this case with our neuroradiology department.  According to their interpretation, the sclerotic trabecular pattern has existed in some CXR radiographs that she has had dating back to 2016, indicating some element of chronicity.  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  There were no aggressive features (cortical breakthrough/erosion, soft tissue expansion) that was appreciated on her prior imaging nor her current CT scan.  I also discussed her case with the patient's PCP, Dr. Palacio.  We could consider biopsy to confirm, but with sampling error there are also risks of excessive bleeding and epidural hematoma.  To that end, it seems the risks may outweigh the benefits.  We could also consider PET/CT to more definitively diagnose radiologically if there was concern for something neoplastic/metastatic mimicking this.  However patient continues to exhibit no systemic/lab concerns.  There are no locoregional symptoms, nor does she have any neurologic symptoms that would drop concern for malignancy.      In our research, most literature advocates for serial exams/imaging to track with procedural intervention only if we see any aggressive features (cortical erosion, soft tissue expansion, etc).  We will continue to track this radiologically and clinically.  I will have Cierra follow-up with me in  approximately 1 year for a repeat CT scan of her thoracic spine.  I did place an order in the system for her to execute around that time.  I did ask her to keep inventory of any systemic or neurologic symptoms, including fever, chills, night sweats, unintended weight loss, lower extremity weakness/numbness/parasthesias, balance/incoordination issues, severe mid-thoracic back pain, or bowel/bladder control issues.     Red flag symptoms that would warrant more urgent communication with my office or presentation to the emergency department were discussed.  She understood all of the above.  All of her questions were answered. After our discussion, the patient articulated understanding of the plan and felt that all questions had been answered satisfactorily. The patient was pleased with the visit and very appreciative for the care rendered.    **Please excuse any errors in grammar or translation related to this dictation. Voice recognition software was utilized to prepare this document. **    F/u 1 year. CT review.        Orders Placed This Encounter    CT thoracic spine wo IV contrast       --    Jimmy Walker MD  Orthopaedic Spine Surgery  , Department of Orthopaedic Surgery  [facility]

## 2025-04-23 NOTE — PROGRESS NOTES
Subjective   Patient ID: Cierra Atkins is a 69 y.o. female who presents for sinus issues    HPI   The patient reports a history of a cough productive of clear sputum x 10 days.  She reports associated nasal congestion, copious most nasal drip, fatigue.  Also, she reports a history of a constant pressure or sharp pain in the right cheek, right retro-orbital region, right upper teeth x 10 days.  Over the past 10 days, she reports that the intensity of the discomfort has waxed and waned, but she reports that the intensity of the discomfort was markedly increased in the evening of April 22.  She also reports that both ears were constantly blocked last week, then unblocked at the end of the week.  She reports recurrent blocked sensation in the left ear as she was descending on a plane coming to [facility] April 21.  She reports that the ear unblocked and then blocked yesterday and she reports that the ear has been constantly blocked since yesterday.  She reports no other associated symptoms..  She reports that the aforementioned symptoms developed approximately 4 days after she was exposed to her 4-year-old grandson who subsequently developed upper respiratory symptoms.    The patient did see her physician in [place] at the end of last week.  She tested negative for COVID-19, RSV, and influenza.  She was not started on any specific therapy.  Review of Systems    Objective   There were no vitals taken for this visit.    Physical Exam  Head - palpation revealed tenderness over the right maxillary sinus but not over the left maxillary sinus or frontal sinuses bilaterally  Ears-right TM not visualized secondary to impacted cerumen; left TM clear  Nose - turbinates not erythematous or swollen; nasal septal deviation noted to the right.  Mouth - posterior pharynx is not erythematous or swollen. Tonsillar pillars appeared normal no exudates.  Neck - no lymphadenopathy. Thyroid gland not enlarged. No bruits.  Lungs -  clear to auscultation bilaterally.  Cardiac - , rate normal, rhythm regular, no murmurs, no JVD  Abdomen - soft nondistended,, hypoactive bowel sounds. There is a 2 x 2 centimeter mass in the umbilicus. Palpation revealed no tenderness or increase in warmth.; no tenderness or masses noted throughout the rest of the abdomen;   Assessment/Plan        Assessment  Cough, nasal congestion, postnasal drip-May be secondary to viral syndrome, sinusitis  Intermittent blocked sensation in the left ear-probably secondary to eustachian tube dysfunction secondary to viral syndrome, sinusitis  Generalized fatigue-likely secondary to viral syndrome, sinusitis  Constant pressure or sharp pain located in the right cheek as well as the right retro-orbital region and the right upper teeth-May represent status migrainosus secondary to viral syndrome, sinusitis.  The discomfort may also be secondary to a viral syndrome, sinusitis.  Plan  Begin Z-Nba as directed.  Continue Flonase nasal spray and add Astelin spray 2 sprays to each nostril twice daily as needed.  I told the patient to continue use of Mucinex DM as needed cough and honey 15 mL every 4-6 hours as needed cough.  The patient will call me in 5 days with her condition

## 2025-04-24 ENCOUNTER — OFFICE VISIT (OUTPATIENT)
Dept: PRIMARY CARE | Facility: CLINIC | Age: 70
End: 2025-04-24
Payer: MEDICARE

## 2025-04-24 VITALS
WEIGHT: 153.2 LBS | BODY MASS INDEX: 27.14 KG/M2 | HEART RATE: 86 BPM | DIASTOLIC BLOOD PRESSURE: 70 MMHG | TEMPERATURE: 97.4 F | SYSTOLIC BLOOD PRESSURE: 110 MMHG

## 2025-04-24 DIAGNOSIS — R79.82 ELEVATED HIGH SENSITIVITY C-REACTIVE PROTEIN: ICD-10-CM

## 2025-04-24 DIAGNOSIS — R10.11 RIGHT UPPER QUADRANT PAIN: ICD-10-CM

## 2025-04-24 DIAGNOSIS — J32.0 CHRONIC MAXILLARY SINUSITIS: Primary | ICD-10-CM

## 2025-04-24 PROCEDURE — 1159F MED LIST DOCD IN RCRD: CPT | Performed by: INTERNAL MEDICINE

## 2025-04-24 PROCEDURE — 99213 OFFICE O/P EST LOW 20 MIN: CPT | Performed by: INTERNAL MEDICINE

## 2025-04-24 PROCEDURE — 1160F RVW MEDS BY RX/DR IN RCRD: CPT | Performed by: INTERNAL MEDICINE

## 2025-04-24 RX ORDER — AZELASTINE 1 MG/ML
2 SPRAY, METERED NASAL 2 TIMES DAILY
Qty: 30 ML | Refills: 2 | Status: SHIPPED | OUTPATIENT
Start: 2025-04-24 | End: 2026-04-24

## 2025-04-24 RX ORDER — AZITHROMYCIN 250 MG/1
TABLET, FILM COATED ORAL
Qty: 6 TABLET | Refills: 0 | Status: SHIPPED | OUTPATIENT
Start: 2025-04-24 | End: 2025-04-29

## 2025-05-01 ENCOUNTER — HOSPITAL ENCOUNTER (OUTPATIENT)
Dept: RADIOLOGY | Facility: CLINIC | Age: 70
Discharge: HOME | End: 2025-05-01
Payer: MEDICARE

## 2025-05-01 DIAGNOSIS — Z12.31 ENCOUNTER FOR SCREENING MAMMOGRAM FOR MALIGNANT NEOPLASM OF BREAST: ICD-10-CM

## 2025-05-01 PROCEDURE — 77067 SCR MAMMO BI INCL CAD: CPT | Performed by: RADIOLOGY

## 2025-05-01 PROCEDURE — 77063 BREAST TOMOSYNTHESIS BI: CPT | Performed by: RADIOLOGY

## 2025-05-01 PROCEDURE — 77067 SCR MAMMO BI INCL CAD: CPT

## 2025-05-05 ENCOUNTER — APPOINTMENT (OUTPATIENT)
Facility: CLINIC | Age: 70
End: 2025-05-05
Payer: COMMERCIAL

## 2025-05-05 VITALS
HEIGHT: 63 IN | DIASTOLIC BLOOD PRESSURE: 77 MMHG | SYSTOLIC BLOOD PRESSURE: 144 MMHG | WEIGHT: 150.4 LBS | BODY MASS INDEX: 26.65 KG/M2

## 2025-05-05 DIAGNOSIS — Z01.419 NORMAL GYNECOLOGIC EXAMINATION: Primary | ICD-10-CM

## 2025-05-05 DIAGNOSIS — Z78.0 MENOPAUSE: ICD-10-CM

## 2025-05-05 PROCEDURE — G0101 CA SCREEN;PELVIC/BREAST EXAM: HCPCS | Performed by: OBSTETRICS & GYNECOLOGY

## 2025-05-05 PROCEDURE — 1126F AMNT PAIN NOTED NONE PRSNT: CPT | Performed by: OBSTETRICS & GYNECOLOGY

## 2025-05-05 PROCEDURE — 3008F BODY MASS INDEX DOCD: CPT | Performed by: OBSTETRICS & GYNECOLOGY

## 2025-05-05 PROCEDURE — 1159F MED LIST DOCD IN RCRD: CPT | Performed by: OBSTETRICS & GYNECOLOGY

## 2025-05-05 PROCEDURE — 1036F TOBACCO NON-USER: CPT | Performed by: OBSTETRICS & GYNECOLOGY

## 2025-05-05 PROCEDURE — 1160F RVW MEDS BY RX/DR IN RCRD: CPT | Performed by: OBSTETRICS & GYNECOLOGY

## 2025-05-05 RX ORDER — ESTRADIOL 0.1 MG/G
CREAM VAGINAL
COMMUNITY
Start: 2025-01-22

## 2025-05-05 ASSESSMENT — ENCOUNTER SYMPTOMS
MUSCULOSKELETAL NEGATIVE: 1
HEMATOLOGIC/LYMPHATIC NEGATIVE: 1
CARDIOVASCULAR NEGATIVE: 1
EYES NEGATIVE: 1
GASTROINTESTINAL NEGATIVE: 1
CONSTITUTIONAL NEGATIVE: 1
RESPIRATORY NEGATIVE: 1
NEUROLOGICAL NEGATIVE: 1
ENDOCRINE NEGATIVE: 1
ALLERGIC/IMMUNOLOGIC NEGATIVE: 1
PSYCHIATRIC NEGATIVE: 1

## 2025-05-05 ASSESSMENT — PATIENT HEALTH QUESTIONNAIRE - PHQ9
SUM OF ALL RESPONSES TO PHQ9 QUESTIONS 1 & 2: 0
2. FEELING DOWN, DEPRESSED OR HOPELESS: NOT AT ALL
1. LITTLE INTEREST OR PLEASURE IN DOING THINGS: NOT AT ALL

## 2025-05-05 ASSESSMENT — PAIN SCALES - GENERAL: PAINLEVEL_OUTOF10: 0-NO PAIN

## 2025-05-05 NOTE — PROGRESS NOTES
Subjective   Patient ID: Cierra Atkins is a 69 y.o. female who presents for Annual Exam (Last pap:  4/29/2024  neg/neg./Last timothy:  5/2/2025  cat 2./Last colon screen:  12/23/2015./Declines gera Hwang LPN).  HPI patient is here for annual visit she has no complaints last Pap test was negative negative patient will need repeat Pap test next year    Review of Systems   Constitutional: Negative.    Eyes: Negative.    Respiratory: Negative.     Cardiovascular: Negative.    Gastrointestinal: Negative.    Endocrine: Negative.    Genitourinary: Negative.    Musculoskeletal: Negative.    Skin: Negative.    Allergic/Immunologic: Negative.    Neurological: Negative.    Hematological: Negative.    Psychiatric/Behavioral: Negative.         Objective   Physical Exam  Constitutional:       Appearance: Normal appearance. She is obese.   HENT:      Head: Normocephalic and atraumatic.   Cardiovascular:      Rate and Rhythm: Normal rate and regular rhythm.      Pulses: Normal pulses.      Heart sounds: Normal heart sounds.   Pulmonary:      Effort: Pulmonary effort is normal.      Breath sounds: Normal breath sounds.   Abdominal:      General: Abdomen is flat. Bowel sounds are normal.      Palpations: Abdomen is soft.      Hernia: There is no hernia in the left inguinal area or right inguinal area.   Genitourinary:     General: Normal vulva.      Exam position: Lithotomy position.      Labia:         Right: No rash, tenderness or lesion.         Left: No rash, tenderness or lesion.       Urethra: No prolapse.      Vagina: Normal.      Cervix: Normal.      Uterus: Normal.       Adnexa: Right adnexa normal and left adnexa normal.   Musculoskeletal:      Cervical back: Normal range of motion and neck supple.   Skin:     General: Skin is warm and dry.   Neurological:      General: No focal deficit present.      Mental Status: She is alert and oriented to person, place, and time.         Assessment/Plan    normal  gynecologic examination mammogram up-to-date patient will have a Pap test next year         Shaq Álvarez MD 05/05/25 11:07 AM

## 2025-05-12 ENCOUNTER — APPOINTMENT (OUTPATIENT)
Dept: RADIOLOGY | Facility: CLINIC | Age: 70
End: 2025-05-12
Payer: MEDICARE

## 2025-05-12 DIAGNOSIS — R10.11 RIGHT UPPER QUADRANT PAIN: ICD-10-CM

## 2025-05-12 PROCEDURE — 76705 ECHO EXAM OF ABDOMEN: CPT

## 2025-05-12 PROCEDURE — 76705 ECHO EXAM OF ABDOMEN: CPT | Performed by: RADIOLOGY

## 2025-06-17 ENCOUNTER — TELEPHONE (OUTPATIENT)
Dept: PRIMARY CARE | Facility: CLINIC | Age: 70
End: 2025-06-17
Payer: MEDICARE

## 2025-06-24 ENCOUNTER — APPOINTMENT (OUTPATIENT)
Dept: GASTROENTEROLOGY | Facility: EXTERNAL LOCATION | Age: 70
End: 2025-06-24
Payer: COMMERCIAL

## 2025-06-24 DIAGNOSIS — Z12.11 ENCOUNTER FOR SCREENING FOR MALIGNANT NEOPLASM OF COLON: Primary | ICD-10-CM

## 2025-06-24 DIAGNOSIS — Z12.11 ENCOUNTER FOR SCREENING FOR MALIGNANT NEOPLASM OF COLON: ICD-10-CM

## 2025-06-24 PROCEDURE — 45378 DIAGNOSTIC COLONOSCOPY: CPT | Performed by: INTERNAL MEDICINE

## 2025-07-02 ENCOUNTER — APPOINTMENT (OUTPATIENT)
Dept: PRIMARY CARE | Facility: CLINIC | Age: 70
End: 2025-07-02
Payer: MEDICARE

## 2025-07-03 ENCOUNTER — HOSPITAL ENCOUNTER (OUTPATIENT)
Dept: RADIOLOGY | Facility: CLINIC | Age: 70
Discharge: HOME | End: 2025-07-03
Payer: MEDICARE

## 2025-07-03 DIAGNOSIS — R79.82 ELEVATED HIGH SENSITIVITY C-REACTIVE PROTEIN: ICD-10-CM

## 2025-07-03 PROCEDURE — 75571 CT HRT W/O DYE W/CA TEST: CPT

## 2025-07-07 ENCOUNTER — TELEPHONE (OUTPATIENT)
Facility: CLINIC | Age: 70
End: 2025-07-07
Payer: MEDICARE

## 2025-07-07 NOTE — TELEPHONE ENCOUNTER
Pt would like a call back about a her calcium scoring test. They found a incidental finding on her left breat. Please advise thank you.  Nikole Cagle MA

## 2025-07-18 ENCOUNTER — TELEPHONE (OUTPATIENT)
Facility: CLINIC | Age: 70
End: 2025-07-18
Payer: MEDICARE

## 2025-07-18 NOTE — TELEPHONE ENCOUNTER
PT would like to speak with you regarding the last conversation. It's about an incidental issue with her recent mammogram. She called in a few weeks ago and never heard back. She wasn't sure if no news, is good news.     Philly Bansal MA

## 2025-07-21 DIAGNOSIS — N63.22 MASS OF UPPER INNER QUADRANT OF LEFT BREAST: Primary | ICD-10-CM

## 2025-07-29 NOTE — PROGRESS NOTES
Cierra Atkins female   1955 69 y.o.   [id number]      Chief Complaint  Left breast mass    History Of Present Illness  Cierra Atkins is a very pleasant 69 y.o.  female [facility] Amol Palacio for left breast mass. She had a recent Cardiac scoring noting a left breast mass. She has family history of breast cancer in her mother. She has a history of bilateral benign excisional breast biopsies.     BREAST IMAGIN2025 Bilateral screening mammogram, indicates BI-RADS Category 2.     REPRODUCTIVE HISTORY: menarche age 12, , first birth age 23,  x 4 months, OCP's x 5 years, natural menopause age 56, no HRT, extremely dense tissue    FAMILY CANCER HISTORY:   Mother: Breast cancer, age 80, Pancreatic cancer, age 88  Father: Pancreatic cancer, age 62  Brother: CML, age 26     Surgical History  She has a past surgical history that includes Other surgical history (2013); Breast biopsy (2013); Hernia repair (2013); Other surgical history (2013); Other surgical history (2013); Dilation and curettage of uterus (2013); Breast surgery (2013); Cervical biopsy w/ loop electrode excision (2017); Other surgical history (2015); Other surgical history (2015); Hand surgery (10/23/2013); Breast biopsy (10/23/2013); and Breast cyst excision ().     Social History  She reports that she has never smoked. She has never been exposed to tobacco smoke. She has never used smokeless tobacco. She reports current alcohol use. She reports that she does not use drugs.    Family History  Family History[1]     Allergies  Penicillins, Aspirin, Cefaclor, Morphine, Risedronate sodium, Salicylates, Strawberry, Adhesive tape-silicones, Sulfa (sulfonamide antibiotics), and Sulfamethoxazole-trimethoprim    Medications  Current Outpatient Medications   Medication Instructions    atenolol (TENORMIN) 50 mg, oral, Daily    azelastine (Astelin)  137 mcg (0.1 %) nasal spray 2 sprays, Each Nostril, 2 times daily, Use in each nostril as directed    cholecalciferol (VITAMIN D-3) 50 mcg, Daily    estradiol (Estrace) 0.01 % (0.1 mg/gram) vaginal cream INSERT 1/4 APPLICATORFUL (1 GM) VAGINALLY 3 TIMES A WEEK    hydrOXYzine pamoate (VISTARIL) 25 mg, oral, Every 6 hours PRN    levothyroxine (SYNTHROID, LEVOXYL) 100 mcg, oral, Daily    magnesium oxide (Mag-Ox) 400 mg (241.3 mg magnesium) tablet 1 tablet, Daily    meloxicam (MOBIC) 7.5 mg, oral, 2 times daily    Nurtec ODT 75 mg, oral, Daily    peppermint oiL (IBgard) 90 mg capsule,delayed,extend.release Take by mouth.         REVIEW OF SYSTEMS    Constitutional:  Negative for appetite change, fatigue, fever and unexpected weight change.   HENT:  Negative for ear pain, hearing loss, nosebleeds, sore throat and trouble swallowing.    Eyes:  Negative for discharge, itching and visual disturbance.   Respiratory:  Negative for cough, chest tightness and shortness of breath.    Cardiovascular:  Negative for chest pain, palpitations and leg swelling.   Breast: as indicated in HPI  Gastrointestinal:  Negative for abdominal pain, constipation, diarrhea and nausea.   Endocrine: Negative for cold intolerance and heat intolerance.   Genitourinary:  Negative for dysuria, frequency, hematuria, pelvic pain and vaginal bleeding.   Musculoskeletal:  Negative for arthralgias, back pain, gait problem, joint swelling and myalgias.   Skin:  Negative for color change and rash.   Allergic/Immunologic: Negative for environmental allergies and food allergies.   Neurological:  Negative for dizziness, tremors, speech difficulty, weakness, numbness and headaches.   Hematological:  Does not bruise/bleed easily.   Psychiatric/Behavioral:  Negative for agitation, dysphoric mood and sleep disturbance. The patient is not nervous/anxious.         Past Medical History  She has a past medical history of Allergic, Anxiety, Arthritis, Breast cyst (cecilia  1978), Encounter for immunization (09/22/2014), Encounter for other screening for malignant neoplasm of breast (01/05/2021), Fever, unspecified (05/24/2017), GERD (gastroesophageal reflux disease), Headache, Inflammatory bowel disease, Low grade squamous intraepithelial lesion on cytologic smear of cervix (LGSIL) (10/02/2015), Mammary duct ectasia of unspecified breast, Personal history of diseases of the skin and subcutaneous tissue (09/25/2017), Personal history of other diseases of the digestive system (10/17/2022), Personal history of other diseases of the nervous system and sense organs (11/30/2016), Personal history of other endocrine, nutritional and metabolic disease (12/20/2022), and Visual impairment.     Physical Exam  Patient is alert and oriented x3 and in a relaxed and appropriate mood. Her gait is steady and hand grasps are equal. Sclera is clear. The breasts are nearly symmetrical with ptosis. The tissue is soft and I am unable to differentiate any palpable abnormalities, discrete nodules or masses. Both breasts have well-healed excisional biopsy incisions. There is a pin point divot of tissue, right breast superior lateral quadrant. The skin and nipples appear normal. There is no cervical, supraclavicular or axillary lymphadenopathy. Heart rate and rhythm normal, S1 and S2 appreciated. The lungs are clear to auscultation bilaterally.      Physical Exam     Last Recorded Vitals  Vitals:    08/04/25 1554   BP: 161/88   Pulse: 76   Temp: 36.7 °C (98 °F)   SpO2: 98%       Relevant Results   Time was spent viewing digital images of the radiology testing with the patient. I explained the results in depth, along with suggested explanation for follow up recommendations based on the testing results. BI-RADS Category 3    Imaging  Narrative & Impression   Interpreted By:  Rhonda Harris,   STUDY:  BI US BREAST LIMITED LEFT;  8/4/2025 2:46 pm      ACCESSION NUMBER(S):  [id number]      ORDERING  CLINICIAN:  TANI ELY      INDICATION:  Diagnostic evaluation for incidental left breast finding on recent CT  examination.      ,R93.89 Abnormal findings on diagnostic imaging of other specified  body structures,N63.42 Unspecified lump in left breast, subareolar      COMPARISON:  Cardiac CT 07/03/2025, mammogram 05/01/2025, left breast ultrasound  10/13/2009      FINDINGS:  ULTRASOUND: Targeted ultrasound was performed of the left breast with  elastography. In the lower-outer left breast at 3:30 o'clock, 5 cm  from the nipple, there is a hypoechoic cystic mass with internal  echogenic debris, possible adjacent cysts versus septated cyst,  measuring up to 3.4 x 1.1 x 1.9 cm. No internal vascularity is  present. The finding is soft on elastography. The internal echogenic  debris is at least partially mobile. This finding correlates with the  left breast abnormality on recent CT. Of note, bilateral breast cysts  are demonstrated on multiple prior examinations.      IMPRESSION:  Probably benign 3.4 cm complicated cyst in the lower outer left  breast. Recommend six-month sonographic follow-up.      BI-RADS CATEGORY:  BI-RADS Category:  3 Probably Benign.  Recommendation:  Short-term Interval Follow-up Imaging.  Recommended Date:  6 Months.  Laterality:  Left.       BI mammo bilateral screening tomosynthesis 05/01/2025    Narrative  Interpreted By:  Cammie Boss,  STUDY:  BI MAMMO BILATERAL SCREENING TOMOSYNTHESIS;  5/1/2025 7:32 am    ACCESSION NUMBER(S):  [id number]    ORDERING CLINICIAN:  HILL MORRISON    INDICATION:  Screening. Bilateral benign excisional breast biopsies. Family  history of breast cancer in mother.    ,Z12.31 Encounter for screening mammogram for malignant neoplasm of  breast    COMPARISON:  04/29/2024, 04/24/2023, 04/20/2022, 03/24/2021    FINDINGS:  2D and tomosynthesis images were reviewed at 1 mm slice thickness.    Density:  The breasts are extremely dense, which lowers the  sensitivity  of mammography.    Stable bilateral circumscribed masses. Stable bilateral postsurgical  scarring in the upper outer aspects of each breast and central right  breast. No suspicious masses or calcifications are identified.    Impression  No mammographic evidence of malignancy.    BI-RADS CATEGORY:  BI-RADS Category:  2 Benign.  Recommendation:  Annual Screening.  Recommended Date:  1 Year.  Laterality:  Bilateral.    Based on the Tyrer-Cuzick model for breast cancer risk assessment,  this patient is at an elevated risk of developing breast cancer and  may benefit from additional screening with breast MRI or ultrasound.  Please note that this estimate is based on responses provided on the  patient questionnaire. For more information regarding high risk  consultation, please call [phone].    For any future breast imaging appointments, please call [phone] ([phone]).      MACRO:  None    Signed by: Cammie Boss 5/3/2025 3:25 PM  Dictation workstation:   [id number]         Assessment/Plan   Stable clinical exam and imaging, left breast mass, stable, history of bilateral excisional biopsies, benign, family history of breast cancer, extremely dense tissue    Plan: Return in May 2026 for bilateral diagnostic mammogram with left breast ultrasound and office visit. She is aware recommendation is 6 month follow up, but [place]. Agreed on follow up at time of annual mammogram.    Patient Discussion/Summary  Your clinical examination and imaging are stable. Please return in May 2026 for bilateral diagnostic mammogram with left breast ultrasound and office visit or sooner if you have any problems or concerns.     You can see your health information, review clinical summaries from office visits & test results online when you follow your health with MY  Chart, a personal health record. To sign up go to [email or web address]. If you need assistance with signing up or trouble getting into your account  call [facility] Patient Line 24/7 at [phone].    My office phone number is [phone] if you need to get in touch with me or have additional questions or concerns. Thank you for choosing [facility] and trusting me as your healthcare provider. I look forward to seeing you again at your next office visit. I am honored to be a provider on your health care team and I remain dedicated to helping you achieve your health goals.      Hailey Lovett, APRN-CNP         [1]   Family History  Problem Relation Name Age of Onset    Pancreatic cancer Mother Yas romano     Breast cancer Mother Yas romano 80    Pancreatic cancer Father Blake romano     Arthritis Father Blake romano     Other (Seizure disorder) Daughter      Cancer Brother Dr rocio Romano

## 2025-08-04 ENCOUNTER — HOSPITAL ENCOUNTER (OUTPATIENT)
Dept: RADIOLOGY | Facility: CLINIC | Age: 70
Discharge: HOME | End: 2025-08-04
Payer: MEDICARE

## 2025-08-04 ENCOUNTER — OFFICE VISIT (OUTPATIENT)
Dept: SURGICAL ONCOLOGY | Facility: CLINIC | Age: 70
End: 2025-08-04
Payer: MEDICARE

## 2025-08-04 VITALS
DIASTOLIC BLOOD PRESSURE: 88 MMHG | WEIGHT: 152.12 LBS | OXYGEN SATURATION: 98 % | HEART RATE: 76 BPM | BODY MASS INDEX: 26.95 KG/M2 | TEMPERATURE: 98 F | SYSTOLIC BLOOD PRESSURE: 161 MMHG

## 2025-08-04 DIAGNOSIS — N63.42 UNSPECIFIED LUMP IN LEFT BREAST, SUBAREOLAR: ICD-10-CM

## 2025-08-04 DIAGNOSIS — N63.23 MASS OF LOWER OUTER QUADRANT OF LEFT BREAST: Primary | ICD-10-CM

## 2025-08-04 DIAGNOSIS — R93.89 ABNORMAL CT SCAN: ICD-10-CM

## 2025-08-04 PROCEDURE — 1126F AMNT PAIN NOTED NONE PRSNT: CPT | Performed by: NURSE PRACTITIONER

## 2025-08-04 PROCEDURE — 99213 OFFICE O/P EST LOW 20 MIN: CPT | Performed by: NURSE PRACTITIONER

## 2025-08-04 PROCEDURE — 76642 ULTRASOUND BREAST LIMITED: CPT | Mod: LT

## 2025-08-04 PROCEDURE — 99203 OFFICE O/P NEW LOW 30 MIN: CPT | Performed by: NURSE PRACTITIONER

## 2025-08-04 PROCEDURE — 76642 ULTRASOUND BREAST LIMITED: CPT | Mod: LEFT SIDE | Performed by: GENERAL PRACTICE

## 2025-08-04 PROCEDURE — 76982 USE 1ST TARGET LESION: CPT

## 2025-08-04 ASSESSMENT — PAIN SCALES - GENERAL: PAINLEVEL_OUTOF10: 0-NO PAIN

## 2025-08-04 NOTE — PATIENT INSTRUCTIONS
Your clinical examination and imaging are stable. Please return in May 2026 for bilateral diagnostic mammogram with left breast ultrasound and office visit or sooner if you have any problems or concerns.     You can see your health information, review clinical summaries from office visits & test results online when you follow your health with MY  Chart, a personal health record. To sign up go to [email or web address]. If you need assistance with signing up or trouble getting into your account call [facility] Patient Line 24/7 at [phone].    My office phone number is [phone] if you need to get in touch with me or have additional questions or concerns. Thank you for choosing [facility] and trusting me as your healthcare provider. I look forward to seeing you again at your next office visit. I am honored to be a provider on your health care team and I remain dedicated to helping you achieve your health goals.

## 2025-11-03 ENCOUNTER — APPOINTMENT (OUTPATIENT)
Dept: PRIMARY CARE | Facility: CLINIC | Age: 70
End: 2025-11-03
Payer: COMMERCIAL

## 2026-03-20 ENCOUNTER — APPOINTMENT (OUTPATIENT)
Dept: ORTHOPEDIC SURGERY | Facility: CLINIC | Age: 71
End: 2026-03-20
Payer: COMMERCIAL